# Patient Record
Sex: MALE | Race: WHITE | NOT HISPANIC OR LATINO | URBAN - METROPOLITAN AREA
[De-identification: names, ages, dates, MRNs, and addresses within clinical notes are randomized per-mention and may not be internally consistent; named-entity substitution may affect disease eponyms.]

---

## 2017-10-06 ENCOUNTER — EMERGENCY (EMERGENCY)
Facility: HOSPITAL | Age: 62
LOS: 0 days | Discharge: HOME | End: 2017-10-06

## 2017-10-06 DIAGNOSIS — Z91.038 OTHER INSECT ALLERGY STATUS: ICD-10-CM

## 2017-10-06 DIAGNOSIS — T63.441A TOXIC EFFECT OF VENOM OF BEES, ACCIDENTAL (UNINTENTIONAL), INITIAL ENCOUNTER: ICD-10-CM

## 2017-10-06 DIAGNOSIS — L03.119 CELLULITIS OF UNSPECIFIED PART OF LIMB: ICD-10-CM

## 2019-07-08 PROBLEM — Z00.00 ENCOUNTER FOR PREVENTIVE HEALTH EXAMINATION: Status: ACTIVE | Noted: 2019-07-08

## 2019-07-09 ENCOUNTER — APPOINTMENT (OUTPATIENT)
Dept: VASCULAR SURGERY | Facility: CLINIC | Age: 64
End: 2019-07-09
Payer: MEDICARE

## 2019-07-09 VITALS
DIASTOLIC BLOOD PRESSURE: 90 MMHG | BODY MASS INDEX: 31.83 KG/M2 | HEIGHT: 68 IN | SYSTOLIC BLOOD PRESSURE: 130 MMHG | WEIGHT: 210 LBS

## 2019-07-09 DIAGNOSIS — Z87.891 PERSONAL HISTORY OF NICOTINE DEPENDENCE: ICD-10-CM

## 2019-07-09 DIAGNOSIS — Z85.46 PERSONAL HISTORY OF MALIGNANT NEOPLASM OF PROSTATE: ICD-10-CM

## 2019-07-09 DIAGNOSIS — M79.89 OTHER SPECIFIED SOFT TISSUE DISORDERS: ICD-10-CM

## 2019-07-09 PROCEDURE — 93971 EXTREMITY STUDY: CPT

## 2019-07-09 PROCEDURE — 99203 OFFICE O/P NEW LOW 30 MIN: CPT

## 2019-07-09 NOTE — ASSESSMENT
[FreeTextEntry1] : 62 y/o gentleman who underwent left ankle replacement in November 2018 for flat foot, now with left leg swelling since the surgery, denies any h/o DVT, has leg heaviness due to swelling.\par I performed a venous duplex which was medically necessary to evaluate for DVT. It showed no evidence of DVT in the left lower extremity.\par I have informed him of the test results and advised use of compression stockings to improve the edema. I have explained to him that he may have chronic leg swelling due to the past surgical intervention.

## 2019-07-09 NOTE — CONSULT LETTER
[Consult Letter:] : I had the pleasure of evaluating your patient, [unfilled]. [Dear  ___] : Dear  [unfilled], [Please see my note below.] : Please see my note below.

## 2019-07-09 NOTE — HISTORY OF PRESENT ILLNESS
[FreeTextEntry1] : 62 y/o gentleman who underwent left ankle replacement in November 2018 for flat foot, now with left leg swelling since the surgery, denies any h/o DVT, has leg heaviness due to swelling.

## 2019-07-09 NOTE — DATA REVIEWED
[FreeTextEntry1] : I performed a venous duplex which was medically necessary to evaluate for DVT. It showed no evidence of DVT in the left lower extremity.\par

## 2021-07-22 ENCOUNTER — INPATIENT (INPATIENT)
Facility: HOSPITAL | Age: 66
LOS: 1 days | Discharge: HOME | End: 2021-07-24
Payer: MEDICARE

## 2021-07-22 VITALS
WEIGHT: 188.05 LBS | TEMPERATURE: 97 F | HEIGHT: 68 IN | DIASTOLIC BLOOD PRESSURE: 46 MMHG | SYSTOLIC BLOOD PRESSURE: 96 MMHG | RESPIRATION RATE: 18 BRPM | OXYGEN SATURATION: 96 % | HEART RATE: 53 BPM

## 2021-07-22 DIAGNOSIS — Z98.890 OTHER SPECIFIED POSTPROCEDURAL STATES: Chronic | ICD-10-CM

## 2021-07-22 LAB
ALBUMIN SERPL ELPH-MCNC: 3.8 G/DL — SIGNIFICANT CHANGE UP (ref 3.5–5.2)
ALP SERPL-CCNC: 60 U/L — SIGNIFICANT CHANGE UP (ref 30–115)
ALT FLD-CCNC: 9 U/L — SIGNIFICANT CHANGE UP (ref 0–41)
ANION GAP SERPL CALC-SCNC: 15 MMOL/L — HIGH (ref 7–14)
APPEARANCE UR: CLEAR — SIGNIFICANT CHANGE UP
APTT BLD: 25.2 SEC — LOW (ref 27–39.2)
AST SERPL-CCNC: 9 U/L — SIGNIFICANT CHANGE UP (ref 0–41)
BASOPHILS # BLD AUTO: 0.03 K/UL — SIGNIFICANT CHANGE UP (ref 0–0.2)
BASOPHILS NFR BLD AUTO: 0.4 % — SIGNIFICANT CHANGE UP (ref 0–1)
BILIRUB SERPL-MCNC: 0.3 MG/DL — SIGNIFICANT CHANGE UP (ref 0.2–1.2)
BILIRUB UR-MCNC: NEGATIVE — SIGNIFICANT CHANGE UP
BUN SERPL-MCNC: 78 MG/DL — CRITICAL HIGH (ref 10–20)
CALCIUM SERPL-MCNC: 8.8 MG/DL — SIGNIFICANT CHANGE UP (ref 8.5–10.1)
CHLORIDE SERPL-SCNC: 95 MMOL/L — LOW (ref 98–110)
CO2 SERPL-SCNC: 20 MMOL/L — SIGNIFICANT CHANGE UP (ref 17–32)
COLOR SPEC: YELLOW — SIGNIFICANT CHANGE UP
CREAT SERPL-MCNC: 3.8 MG/DL — HIGH (ref 0.7–1.5)
DIFF PNL FLD: NEGATIVE — SIGNIFICANT CHANGE UP
EOSINOPHIL # BLD AUTO: 0.05 K/UL — SIGNIFICANT CHANGE UP (ref 0–0.7)
EOSINOPHIL NFR BLD AUTO: 0.7 % — SIGNIFICANT CHANGE UP (ref 0–8)
GLUCOSE SERPL-MCNC: 96 MG/DL — SIGNIFICANT CHANGE UP (ref 70–99)
GLUCOSE UR QL: NEGATIVE MG/DL — SIGNIFICANT CHANGE UP
HCT VFR BLD CALC: 31.3 % — LOW (ref 42–52)
HGB BLD-MCNC: 10.6 G/DL — LOW (ref 14–18)
IMM GRANULOCYTES NFR BLD AUTO: 0.6 % — HIGH (ref 0.1–0.3)
INR BLD: 1.01 RATIO — SIGNIFICANT CHANGE UP (ref 0.65–1.3)
KETONES UR-MCNC: NEGATIVE — SIGNIFICANT CHANGE UP
LACTATE SERPL-SCNC: 1.4 MMOL/L — SIGNIFICANT CHANGE UP (ref 0.7–2)
LEUKOCYTE ESTERASE UR-ACNC: NEGATIVE — SIGNIFICANT CHANGE UP
LYMPHOCYTES # BLD AUTO: 0.67 K/UL — LOW (ref 1.2–3.4)
LYMPHOCYTES # BLD AUTO: 9.3 % — LOW (ref 20.5–51.1)
MCHC RBC-ENTMCNC: 31 PG — SIGNIFICANT CHANGE UP (ref 27–31)
MCHC RBC-ENTMCNC: 33.9 G/DL — SIGNIFICANT CHANGE UP (ref 32–37)
MCV RBC AUTO: 91.5 FL — SIGNIFICANT CHANGE UP (ref 80–94)
MONOCYTES # BLD AUTO: 0.53 K/UL — SIGNIFICANT CHANGE UP (ref 0.1–0.6)
MONOCYTES NFR BLD AUTO: 7.3 % — SIGNIFICANT CHANGE UP (ref 1.7–9.3)
NEUTROPHILS # BLD AUTO: 5.92 K/UL — SIGNIFICANT CHANGE UP (ref 1.4–6.5)
NEUTROPHILS NFR BLD AUTO: 81.7 % — HIGH (ref 42.2–75.2)
NITRITE UR-MCNC: NEGATIVE — SIGNIFICANT CHANGE UP
NRBC # BLD: 0 /100 WBCS — SIGNIFICANT CHANGE UP (ref 0–0)
PH UR: 5.5 — SIGNIFICANT CHANGE UP (ref 5–8)
PLATELET # BLD AUTO: 205 K/UL — SIGNIFICANT CHANGE UP (ref 130–400)
POTASSIUM SERPL-MCNC: 4.6 MMOL/L — SIGNIFICANT CHANGE UP (ref 3.5–5)
POTASSIUM SERPL-SCNC: 4.6 MMOL/L — SIGNIFICANT CHANGE UP (ref 3.5–5)
PROT SERPL-MCNC: 5.5 G/DL — LOW (ref 6–8)
PROT UR-MCNC: NEGATIVE MG/DL — SIGNIFICANT CHANGE UP
PROTHROM AB SERPL-ACNC: 11.6 SEC — SIGNIFICANT CHANGE UP (ref 9.95–12.87)
RBC # BLD: 3.42 M/UL — LOW (ref 4.7–6.1)
RBC # FLD: 13.2 % — SIGNIFICANT CHANGE UP (ref 11.5–14.5)
SARS-COV-2 RNA SPEC QL NAA+PROBE: SIGNIFICANT CHANGE UP
SODIUM SERPL-SCNC: 130 MMOL/L — LOW (ref 135–146)
SP GR SPEC: 1.01 — SIGNIFICANT CHANGE UP (ref 1.01–1.03)
TROPONIN T SERPL-MCNC: <0.01 NG/ML — SIGNIFICANT CHANGE UP
UROBILINOGEN FLD QL: 0.2 MG/DL — SIGNIFICANT CHANGE UP (ref 0.2–0.2)
WBC # BLD: 7.24 K/UL — SIGNIFICANT CHANGE UP (ref 4.8–10.8)
WBC # FLD AUTO: 7.24 K/UL — SIGNIFICANT CHANGE UP (ref 4.8–10.8)

## 2021-07-22 PROCEDURE — 93010 ELECTROCARDIOGRAM REPORT: CPT

## 2021-07-22 PROCEDURE — 70498 CT ANGIOGRAPHY NECK: CPT | Mod: 26,MA

## 2021-07-22 PROCEDURE — 70450 CT HEAD/BRAIN W/O DYE: CPT | Mod: 26,59

## 2021-07-22 PROCEDURE — 99291 CRITICAL CARE FIRST HOUR: CPT

## 2021-07-22 PROCEDURE — 99222 1ST HOSP IP/OBS MODERATE 55: CPT

## 2021-07-22 PROCEDURE — 0042T: CPT

## 2021-07-22 PROCEDURE — 70496 CT ANGIOGRAPHY HEAD: CPT | Mod: 26,MA

## 2021-07-22 PROCEDURE — 76775 US EXAM ABDO BACK WALL LIM: CPT | Mod: 26

## 2021-07-22 RX ORDER — HEPARIN SODIUM 5000 [USP'U]/ML
5000 INJECTION INTRAVENOUS; SUBCUTANEOUS EVERY 8 HOURS
Refills: 0 | Status: DISCONTINUED | OUTPATIENT
Start: 2021-07-22 | End: 2021-07-24

## 2021-07-22 RX ORDER — CHLORHEXIDINE GLUCONATE 213 G/1000ML
1 SOLUTION TOPICAL
Refills: 0 | Status: DISCONTINUED | OUTPATIENT
Start: 2021-07-22 | End: 2021-07-24

## 2021-07-22 RX ORDER — SODIUM CHLORIDE 9 MG/ML
1000 INJECTION, SOLUTION INTRAVENOUS
Refills: 0 | Status: DISCONTINUED | OUTPATIENT
Start: 2021-07-22 | End: 2021-07-24

## 2021-07-22 RX ORDER — ASPIRIN/CALCIUM CARB/MAGNESIUM 324 MG
81 TABLET ORAL DAILY
Refills: 0 | Status: DISCONTINUED | OUTPATIENT
Start: 2021-07-22 | End: 2021-07-22

## 2021-07-22 RX ORDER — SODIUM CHLORIDE 9 MG/ML
1000 INJECTION, SOLUTION INTRAVENOUS ONCE
Refills: 0 | Status: COMPLETED | OUTPATIENT
Start: 2021-07-22 | End: 2021-07-22

## 2021-07-22 RX ORDER — ATORVASTATIN CALCIUM 80 MG/1
80 TABLET, FILM COATED ORAL AT BEDTIME
Refills: 0 | Status: DISCONTINUED | OUTPATIENT
Start: 2021-07-22 | End: 2021-07-23

## 2021-07-22 RX ORDER — FUROSEMIDE 40 MG
20 TABLET ORAL DAILY
Refills: 0 | Status: DISCONTINUED | OUTPATIENT
Start: 2021-07-22 | End: 2021-07-23

## 2021-07-22 RX ADMIN — SODIUM CHLORIDE 1000 MILLILITER(S): 9 INJECTION, SOLUTION INTRAVENOUS at 16:38

## 2021-07-22 RX ADMIN — SODIUM CHLORIDE 100 MILLILITER(S): 9 INJECTION, SOLUTION INTRAVENOUS at 23:04

## 2021-07-22 NOTE — ED PROVIDER NOTE - OBJECTIVE STATEMENT
66 yo M pmhx HTN BIBEMS for evaluation of sudden visual changes, dizziness, diaphoresis, and seizure like episode at 3pm today. Pt reports he was playing in his yard with his grandson, sudden onset of bilateral blurry vision, felt dizzy and became diaphoretic, lasted 8 minutes, came inside to his wife who gave him water and had him sit down. Wife reports tonic clonic like shaking for 1 minute. Wife states pt had slurred speech for a few minutes. Since that time pt symptoms have completely resolved and have not returned. Denies fever, chills, nausea, vomiting, chest pain, sob, weakness, headache, numbness/tingling, facial droop.

## 2021-07-22 NOTE — H&P ADULT - HISTORY OF PRESENT ILLNESS
Patient is a 65y old  Male with PMhx of HTN, had blurry vision x 1 day and possible seizure witness by wife. Pt has no urinary or bowel incontinence No fever chills.

## 2021-07-22 NOTE — ED PROVIDER NOTE - CARE PLAN
Principal Discharge DX:	Meningioma  Secondary Diagnosis:	LOIS (acute kidney injury)  Secondary Diagnosis:	Visual changes   Principal Discharge DX:	Seizure-like activity  Secondary Diagnosis:	LOIS (acute kidney injury)  Secondary Diagnosis:	Visual changes  Secondary Diagnosis:	Transient ischemic attack (TIA)

## 2021-07-22 NOTE — ED PROVIDER NOTE - ATTENDING CONTRIBUTION TO CARE
* I agree with PA documentation*  I personally evaluated patient. I agree with the findings and plan with all documentation on chart except as documented  in my note.    Patient called a Stroke Code upon arrival for vision changes and dizziness. History taken with GRANT Brandt.    64 yo M PMHx HTN BIBEMS for evaluation of sudden visual changes, dizziness, diaphoresis, and seizure like episode at 3pm today. Pt reports he was playing in his yard with his grandson, sudden onset of bilateral blurry vision, felt dizzy and became diaphoretic, lasted 8 minutes, came inside to his wife who gave him water and had him sit down. Wife reports tonic clonic like shaking for 1 minute. Wife states pt had slurred speech for a few minutes. Since that time pt symptoms have completely resolved and have not returned. Denies fever, chills, nausea, vomiting, chest pain, sob, weakness, headache, numbness/tingling, facial droop.    Patient reports losing about 20-25 pounds recently and made significant changes to his diet to become healthier. PMD is Dr. Heath. Patient went for emergent CT scan and CTA of head and neck/CTP. Initial NIH score is 0 and patient is not a t-PA candidate. Repeat Neuro exam unchanged and patient remains feeling well. Patient has no LVO and is not an interventional candidate. CT head shows a small meningioma, which is unlikely causing any of his symptoms today. Labs are concerning for LOIS. Patient had elevated BUN with Creatinine of 3.8. K= and fluid status are normal and patient is urinating normally. UA sent, renal US ordered. Case discussed with Neuro Dr. Naun Weems and will admit to CCU for Neuro checks and further work up, possible EEG testing. Dr. Heath aware of admission and requests Dr. Escobar for Renal. Consult called. Dr. Churchill called and accepts CCU admission. Will not start and anti-epileptic medication at this time. Will admit for LOIS with possible TIA vs new-onset seizure.    Patient and family spoken to in detail about results and plan of care.  All questions addressed.  Results of ED work up discussed and patient/family given a copy of the results. They are aware patient requires admission for further treatment and work up.

## 2021-07-22 NOTE — ED PROVIDER NOTE - PROGRESS NOTE DETAILS
NC: Spoke to Neurology, admit pt to CCU, q4 hour neuro checks, EEG, TIA workup NC: Spoke to Dr. Escobar recommend hydration.

## 2021-07-22 NOTE — H&P ADULT - ASSESSMENT
A/P:     1. Seizure / r/o CVA   - neuro checks q 4 hrs   - 2 d echo   - carotid dopplers   - IV fluids   - replete electrolytes   - DVT ppx  A/P:     1. Seizure / r/o CVA   - neuro checks q 4 hrs   - 2 d echo   - consider eeg   - IV fluids   - replete electrolytes   - CT scan reviwed + meningioma   - neuro fu   - DVT ppx

## 2021-07-22 NOTE — H&P ADULT - NSHPPHYSICALEXAM_GEN_ALL_CORE
PHYSICAL EXAM:  GENERAL: NAD, well-groomed, well-developed  HEAD:  Atraumatic, Normocephalic  EYES: EOMI, PERRLA, conjunctiva and sclera clear  ENMT: no thrush   NECK: Supple, No JVD, Normal thyroid  NERVOUS SYSTEM:  Alert & Oriented X3,    CHEST/LUNG: Clear to percussion bilaterally; No rales, rhonchi, wheezing, or rubs  HEART: Regular rate and rhythm; No murmurs, rubs, or gallops  ABDOMEN: Soft, Nontender, Nondistended; Bowel sounds present  EXTREMITIES:  2+ Peripheral Pulses, No clubbing, cyanosis, or edema  LYMPH: No lymphadenopathy noted  SKIN: No rashes or lesions

## 2021-07-22 NOTE — ED PROVIDER NOTE - PHYSICAL EXAMINATION
GENERAL: Well-nourished, Well-developed. NAD.  HEAD: No visible or palpable bumps or hematomas. No ecchymosis behind ears B/L.  Eyes: PERRLA, EOMI. No asymmetry. No nystagmus. No conjunctival injection. Non-icteric sclera.  ENMT: MMM.   Neck: Supple. FROM  CVS: RRR. Normal S1,S2. No murmurs appreciated on auscultation   RESP: No use of accessory muscles. Chest rise symmetrical with good expansion. Lungs clear to auscultation B/L. No wheezing, rales, or rhonchi auscultated.  GI: Normal auscultation of bowel sounds in all 4 quadrants. Soft, Nontender, Nondistended. No guarding or rebound tenderness. No CVAT B/L.  Skin: Warm, Dry. No rashes or lesions. Good cap refill < 2 sec B/L.  EXT: Radial and pedal pulses present B/L. No calf tenderness or swelling B/L. No palpable cords. No pedal edema B/L.  Neuro: AA&O x 3. CNs II-XII grossly intact. Speaking in full sentences. No slurring of speech. No facial droop. No tremors. Sensation grossly intact. Strength 5/5 B/L. Gait within normal limits. Negative Romberg and Pronator Drift. Normal Finger to nose exam. Visual fields intact.

## 2021-07-22 NOTE — ED PROVIDER NOTE - NS ED ROS FT
Constitutional: (-) fever (-) malaise (-) diaphoresis (-) chills   Eyes: (+) visual changes (-) eye pain (-) eye discharge (-) photophobia (-) FB sensation  ENMT: (-) nasal or chest congestion (-) runny nose (-) sore throat (-) hoarseness  (-) hearing changes (-) ear pain (-) ear discharge or infections (-) neck pain (-) neck stiffness  Cardiac: (-) chest pain  (-) palpitations (-) syncope (-) edema  Respiratory: (-) cough (-) SOB (-) CONTRERAS  GI: (-) nausea (-) vomiting (-) diarrhea (-) abdominal pain  : (-) dysuria (-) increased frequency  (-) hematuria (-) incontinence  MS: (-) back pain (-) myalgia (-) muscle weakness (-)  joint pain  Neuro: (-) headache (+) dizziness (-) numbness/tingling to extremities B/L (-) weakness (-) LOC (-) head injury (-) AMS (-) saddle anesthesia (-) tremors  Skin: (-) rash (-) laceration    Except as documented in the HPI, all other systems are negative.

## 2021-07-22 NOTE — PATIENT PROFILE ADULT - FALL HARM RISK CONCLUSION
See 10/22/19 refill encounter note:    October 25, 2019   Veronica Saeed, RN          11:02 AM   Note      I spoke to patient. She states that she has moved and that she has seen a provider in [place] but that the pharmacy and doctor haven't quite got all of her prescriptions right. She asked for one refill of her insulin needles because she only has 2 left. Will send in one refill. Messaged pharmacy to contact her current provider for any further refills.  Veronica Saeed, RN            I called pharmacy, they also say the patient has gotten  and has a different last name but they found her and will re-route to new PCP.    Megan Hendrickson, LATHA  [facility]       Fall Risk

## 2021-07-22 NOTE — ED PROVIDER NOTE - CLINICAL SUMMARY MEDICAL DECISION MAKING FREE TEXT BOX
64 yo M PMHx HTN BIBEMS for evaluation of sudden visual changes, dizziness, diaphoresis, and seizure like episode at 3pm today. Pt reports he was playing in his yard with his grandson, sudden onset of bilateral blurry vision, felt dizzy and became diaphoretic, lasted 8 minutes, came inside to his wife who gave him water and had him sit down. Wife reports tonic clonic like shaking for 1 minute. Wife states pt had slurred speech for a few minutes. Since that time pt symptoms have completely resolved and have not returned. Denies fever, chills, nausea, vomiting, chest pain, sob, weakness, headache, numbness/tingling, facial droop.    Patient reports losing about 20-25 pounds recently and made significant changes to his diet to become healthier. PMD is Dr. Heath. Patient went for emergent CT scan and CTA of head and neck/CTP. Initial NIH score is 0 and patient is not a t-PA candidate. Repeat Neuro exam unchanged and patient remains feeling well. Patient has no LVO and is not an interventional candidate. CT head shows a small meningioma, which is unlikely causing any of his symptoms today. Labs are concerning for LOIS. Patient had elevated BUN with Creatinine of 3.8. K= and fluid status are normal and patient is urinating normally. UA sent, renal US ordered. Case discussed with Neuro Dr. Naun Weems and will admit to CCU for Neuro checks and further work up, possible EEG testing. Dr. Heath aware of admission and requests Dr. Escobar for Renal. Consult called. Dr. Chucrhill called and accepts CCU admission. Will not start and anti-epileptic medication at this time. Will admit for LOIS with possible TIA vs new-onset seizure.

## 2021-07-22 NOTE — H&P ADULT - NSHPREVIEWOFSYSTEMS_GEN_ALL_CORE
REVIEW OF SYSTEMS  General:	no dizziness  Skin/Breast: no rashes   Ophthalmologic:	had blurry vision   ENMT:	no thrush   Respiratory and Thorax: 	no sob   Cardiovascular:	no chest pain   Gastrointestinal:	no diarrhea   Genitourinary:	no dysuria  Musculoskeletal:	no wekaness  Neurological:	no wekaness   Psychiatric:	no hallucinations

## 2021-07-22 NOTE — H&P ADULT - NSHPLABSRESULTS_GEN_ALL_CORE
labs  07-22    130<L>  |  95<L>  |  78<HH>  ----------------------------<  96  4.6   |  20  |  3.8<H>    Ca    8.8      22 Jul 2021 16:45    TPro  5.5<L>  /  Alb  3.8  /  TBili  0.3  /  DBili  x   /  AST  9   /  ALT  9   /  AlkPhos  60  07-22                          10.6   7.24  )-----------( 205      ( 22 Jul 2021 16:45 )             31.3         PT/INR - ( 22 Jul 2021 16:45 )   PT: 11.60 sec;   INR: 1.01 ratio         PTT - ( 22 Jul 2021 16:45 )  PTT:25.2 sec

## 2021-07-22 NOTE — ED ADULT NURSE NOTE - NSIMPLEMENTINTERV_GEN_ALL_ED
Implemented All Universal Safety Interventions:  [place] to call system. Call bell, personal items and telephone within reach. Instruct patient to call for assistance. Room bathroom lighting operational. Non-slip footwear when patient is off stretcher. Physically safe environment: no spills, clutter or unnecessary equipment. Stretcher in lowest position, wheels locked, appropriate side rails in place.

## 2021-07-23 ENCOUNTER — TRANSCRIPTION ENCOUNTER (OUTPATIENT)
Age: 66
End: 2021-07-23

## 2021-07-23 LAB
A1C WITH ESTIMATED AVERAGE GLUCOSE RESULT: 5.1 % — SIGNIFICANT CHANGE UP (ref 4–5.6)
ANION GAP SERPL CALC-SCNC: 9 MMOL/L — SIGNIFICANT CHANGE UP (ref 7–14)
BUN SERPL-MCNC: 57 MG/DL — HIGH (ref 10–20)
CALCIUM SERPL-MCNC: 8.8 MG/DL — SIGNIFICANT CHANGE UP (ref 8.5–10.1)
CHLORIDE SERPL-SCNC: 103 MMOL/L — SIGNIFICANT CHANGE UP (ref 98–110)
CHOLEST SERPL-MCNC: 143 MG/DL — SIGNIFICANT CHANGE UP
CO2 SERPL-SCNC: 22 MMOL/L — SIGNIFICANT CHANGE UP (ref 17–32)
CREAT SERPL-MCNC: 1.8 MG/DL — HIGH (ref 0.7–1.5)
ESTIMATED AVERAGE GLUCOSE: 100 MG/DL — SIGNIFICANT CHANGE UP (ref 68–114)
GLUCOSE SERPL-MCNC: 136 MG/DL — HIGH (ref 70–99)
HCT VFR BLD CALC: 30.3 % — LOW (ref 42–52)
HCV AB S/CO SERPL IA: 0.03 COI — SIGNIFICANT CHANGE UP
HCV AB SERPL-IMP: SIGNIFICANT CHANGE UP
HDLC SERPL-MCNC: 46 MG/DL — SIGNIFICANT CHANGE UP
HGB BLD-MCNC: 10.2 G/DL — LOW (ref 14–18)
LIPID PNL WITH DIRECT LDL SERPL: 73 MG/DL — SIGNIFICANT CHANGE UP
MAGNESIUM SERPL-MCNC: 2.4 MG/DL — SIGNIFICANT CHANGE UP (ref 1.8–2.4)
MCHC RBC-ENTMCNC: 31.2 PG — HIGH (ref 27–31)
MCHC RBC-ENTMCNC: 33.7 G/DL — SIGNIFICANT CHANGE UP (ref 32–37)
MCV RBC AUTO: 92.7 FL — SIGNIFICANT CHANGE UP (ref 80–94)
NON HDL CHOLESTEROL: 97 MG/DL — SIGNIFICANT CHANGE UP
NRBC # BLD: 0 /100 WBCS — SIGNIFICANT CHANGE UP (ref 0–0)
PLATELET # BLD AUTO: 180 K/UL — SIGNIFICANT CHANGE UP (ref 130–400)
POTASSIUM SERPL-MCNC: 4.7 MMOL/L — SIGNIFICANT CHANGE UP (ref 3.5–5)
POTASSIUM SERPL-SCNC: 4.7 MMOL/L — SIGNIFICANT CHANGE UP (ref 3.5–5)
RBC # BLD: 3.27 M/UL — LOW (ref 4.7–6.1)
RBC # FLD: 13.3 % — SIGNIFICANT CHANGE UP (ref 11.5–14.5)
SODIUM SERPL-SCNC: 134 MMOL/L — LOW (ref 135–146)
TRIGL SERPL-MCNC: 124 MG/DL — SIGNIFICANT CHANGE UP
TSH SERPL-MCNC: 1.33 UIU/ML — SIGNIFICANT CHANGE UP (ref 0.27–4.2)
WBC # BLD: 4.77 K/UL — LOW (ref 4.8–10.8)
WBC # FLD AUTO: 4.77 K/UL — LOW (ref 4.8–10.8)

## 2021-07-23 PROCEDURE — 99232 SBSQ HOSP IP/OBS MODERATE 35: CPT

## 2021-07-23 PROCEDURE — 93306 TTE W/DOPPLER COMPLETE: CPT | Mod: 26

## 2021-07-23 PROCEDURE — 99222 1ST HOSP IP/OBS MODERATE 55: CPT

## 2021-07-23 RX ORDER — SODIUM CHLORIDE 9 MG/ML
500 INJECTION, SOLUTION INTRAVENOUS ONCE
Refills: 0 | Status: COMPLETED | OUTPATIENT
Start: 2021-07-23 | End: 2021-07-23

## 2021-07-23 RX ADMIN — HEPARIN SODIUM 5000 UNIT(S): 5000 INJECTION INTRAVENOUS; SUBCUTANEOUS at 21:59

## 2021-07-23 RX ADMIN — SODIUM CHLORIDE 100 MILLILITER(S): 9 INJECTION, SOLUTION INTRAVENOUS at 06:11

## 2021-07-23 RX ADMIN — HEPARIN SODIUM 5000 UNIT(S): 5000 INJECTION INTRAVENOUS; SUBCUTANEOUS at 14:06

## 2021-07-23 RX ADMIN — CHLORHEXIDINE GLUCONATE 1 APPLICATION(S): 213 SOLUTION TOPICAL at 06:31

## 2021-07-23 RX ADMIN — SODIUM CHLORIDE 666.67 MILLILITER(S): 9 INJECTION, SOLUTION INTRAVENOUS at 00:00

## 2021-07-23 RX ADMIN — SODIUM CHLORIDE 1000 MILLILITER(S): 9 INJECTION, SOLUTION INTRAVENOUS at 02:00

## 2021-07-23 NOTE — CONSULT NOTE ADULT - ASSESSMENT
LOIS   - Baseline Cr unavailable   - rapidly improving already   - due to hypotension / volume depletion, lasix and ACE-I   - UA bland, renal sono nl   - s/p iv dye in ED  resolved hypotension  syncope / ?seizure  meningioma    plan:    cont IVF  keep off lasix and lisinopril  monitor BP's   encourage po hydration  no NSAIDs  obtain recent Cr  f/u neuro  outpt renal f/u with BMP check

## 2021-07-23 NOTE — DISCHARGE NOTE PROVIDER - HOSPITAL COURSE
65y old  Male with PMhx of HTN, had blurry vision x 1 day and possible seizure witness by wife. Pt has no urinary or bowel incontinence No fever chills.  likely patient had Orthostatic hypotension- improved/ s/p IVF, less likely stroke, sunitalup august, also found to have LOIS likely pre-renal improved s/p IVF    65y old  Male with PMhx of HTN, had blurry vision x 1 day and possible seizure witness by wife. Pt has no urinary or bowel incontinence No fever chills.  likely patient had Orthostatic hypotension- improved/ s/p IVF, less likely stroke, workup negative, also found to have LOIS likely pre-renal improved s/p IVF

## 2021-07-23 NOTE — DISCHARGE NOTE PROVIDER - CARE PROVIDER_API CALL
David Heath [facility]  [address] [place] [address]  Phone: [phone]  Fax: [phone]  Follow Up Time: 2 weeks

## 2021-07-23 NOTE — DISCHARGE NOTE PROVIDER - NSDCMRMEDTOKEN_GEN_ALL_CORE_FT
furosemide 20 mg oral tablet: 1 tab(s) orally once a day  lisinopril 10 mg oral tablet: 1 tab(s) orally once a day  tadalafil 5 mg oral tablet: 1 tab(s) orally once a day

## 2021-07-23 NOTE — DISCHARGE NOTE PROVIDER - INSTRUCTIONS
Eat more vegetables and fruits.  Swap refined grains for whole grains.  Choose fat-free or low-fat dairy products.  Choose lean protein sources like fish, poultry and beans.  Cook with vegetable oils.  Limit your intake of foods high in added sugars, like soda and candy.

## 2021-07-23 NOTE — PROGRESS NOTE ADULT - ASSESSMENT
65y old  Male with PMhx of HTN, had blurry vision x 1 day and possible seizure witness by wife. Pt has no urinary or bowel incontinence No fever chills.       #Orthostatic hypotension- improved/ less likely seizure  - neuro checks q 4 hrs   - 2 d echo  normal   - S/p IVF, c/w for now, tolerating PO diet  - CTH / CTA H/N negative  - F/u A1c and lipid profile   - Neurology follow up    #HTN   - resume BP medicine if hypertensive    #LOIS likely pre-renal - imorived s/p IVF  - nephrology consult         Diet regular   DVT PPX lovenox   GI ppx not indicated   Dispo from home   Code full

## 2021-07-23 NOTE — CONSULT NOTE ADULT - ATTENDING COMMENTS
Patient seen and examined and agree with above except as noted.  Patients history, notes, labs, imaging, vitals and meds reviewed personally.  History reviewed with patient and he states he was aggressively working out and not drinking much and lost 25 pounds in 6-7 weeks.  His presentation was discussed with me yesterday and history suggested convulsive syncope episodes and dehydration due too the hypotension.  Labs supported dehydration  NIHSS 0  mrankin 0    Plan as above
Attending Statement: I have personally performed a face to face diagnostic evaluation on this patient. The patient is suffering from dizziness/ hypotension/ acute renal failure and dehydration.  I have reviewed the above note and agree with the history, exam and suggestions for care, except as I have noted in the text. I have amended the treatment plans as necessary.

## 2021-07-23 NOTE — CONSULT NOTE ADULT - SUBJECTIVE AND OBJECTIVE BOX
NEPHROLOGY CONSULTATION NOTE    64 yo male with PMH of HTN on lasix and lisinopril p/w dizziness, diaphoresis and possible seizure activity.  BP's on admission low.  Found to have LOIS.  Renal consultted.  Baseline Cr not available.  Pt states he has recently lost weight, dieted and increased exercise.  Given IVF in ED, now in ICU with improvement in renal function and BP's.  No significant voiding complaints.    PAST MEDICAL & SURGICAL HISTORY:  HTN (hypertension)    Prostate cancer    History of prostate surgery      Allergies:  No Known Allergies    Home Medications Reviewed    SOCIAL HISTORY:  Denies ETOH,Smoking,   FAMILY HISTORY:  No pertinent family history in first degree relatives          REVIEW OF SYSTEMS:  CONSTITUTIONAL: + weakness, no fevers or chills  EYES/ENT: No visual changes;  No vertigo or throat pain + dizziness yesterday  NECK: No pain or stiffness  RESPIRATORY: No cough, wheezing, hemoptysis; No shortness of breath  CARDIOVASCULAR: No chest pain or palpitations.  GASTROINTESTINAL: No abdominal or epigastric pain. No nausea, vomiting, or hematemesis; No diarrhea or constipation. No melena or hematochezia.  GENITOURINARY: No dysuria, frequency, foamy urine, urinary urgency, incontinence or hematuria  NEUROLOGICAL: No numbness or weakness  SKIN: No itching, burning, rashes, or lesions   VASCULAR: No bilateral lower extremity edema.   All other review of systems is negative unless indicated above.    PHYSICAL EXAM:  Constitutional: NAD  HEENT: anicteric sclera, oropharynx clear, MMM  Neck: No JVD  Respiratory: CTAB, no wheezes, rales or rhonchi  Cardiovascular: S1, S2, RRR  Gastrointestinal: BS+, soft, NT/ND  Extremities: No cyanosis or clubbing. No peripheral edema  Neurological: A/O x 3, no focal deficits  Psychiatric: Normal mood, normal affect  : No CVA tenderness. No rdz.   Skin: No rashes    Hospital Medications:   MEDICATIONS  (STANDING):  atorvastatin 80 milliGRAM(s) Oral at bedtime  chlorhexidine 4% Liquid 1 Application(s) Topical <User Schedule>  heparin   Injectable 5000 Unit(s) SubCutaneous every 8 hours  lactated ringers. 1000 milliLiter(s) (100 mL/Hr) IV Continuous <Continuous>        VITALS:  T(F): 96.5 (21 @ 07:10), Max: 97 (07-22-21 @ 15:37)  HR: 72 (21 @ 08:00)  BP: 145/81 (21 @ 08:00)  RR: 20 (21 @ 08:00)  SpO2: 100% (21 @ 08:00)  Wt(kg): --     @ 07:01  -   @ 07:00  --------------------------------------------------------  IN: 2100 mL / OUT: 1625 mL / NET: 475 mL     @ 07:01  -   @ 09:20  --------------------------------------------------------  IN: 100 mL / OUT: 0 mL / NET: 100 mL      Height (cm): 172.7 ( 20:29)  Weight (kg): 75.3 ( 20:29)  BMI (kg/m2): 25.2 ( 20:29)  BSA (m2): 1.89 ( 20:29)    LABS:      134<L>  |  103  |  57<H>  ----------------------------<  136<H>  4.7   |  22  |  1.8<H>    Ca    8.8      2021 05:41  Mg     2.4         TPro  5.5<L>  /  Alb  3.8  /  TBili  0.3  /  DBili      /  AST  9   /  ALT  9   /  AlkPhos  60                            10.2   4.77  )-----------( 180      ( 2021 05:41 )             30.3       Urine Studies:  Urinalysis Basic - ( 2021 18:00 )    Color: Yellow / Appearance: Clear / S.015 / pH:   Gluc:  / Ketone: Negative  / Bili: Negative / Urobili: 0.2 mg/dL   Blood:  / Protein: Negative mg/dL / Nitrite: Negative   Leuk Esterase: Negative / RBC:  / WBC    Sq Epi:  / Non Sq Epi:  / Bacteria:           RADIOLOGY & ADDITIONAL STUDIES:  
Neurology Consult  [facility] [id number]    Patient is a 65y old  Male who presents with a chief complaint of possible seizure / CVA (2021 13:54)      HPI:  Patient is a 65y old  Male with PMhx of HTN, had blurry vision x 1 day and possible seizure witness by wife. Pt has no urinary or bowel incontinence No fever chills.        (2021 20:40)      Interim HPI -  Pt seen at bedside with Dr Yeh, patient is awake and oriented with no complaints. Pt denies any h/a, dizziness, or visual disturbances. Pt states he has no other complaints.  He admits to above history and attributes to a one month hx of excessive physical exercise, associated with 25lb weight loss, decreased oral intake in hopes of dieting, and lack of oral hydration.  Pt is aware of dangers caused to kidneys and overall health - pt has no prior neurological issues.      PAST MEDICAL & SURGICAL HISTORY:  HTN (hypertension)    Prostate cancer    History of prostate surgery        FAMILY HISTORY:  No pertinent family history in first degree relatives        Allergies    No Known Allergies    Intolerances        MEDICATIONS  (STANDING):  chlorhexidine 4% Liquid 1 Application(s) Topical <User Schedule>  heparin   Injectable 5000 Unit(s) SubCutaneous every 8 hours  lactated ringers. 1000 milliLiter(s) (100 mL/Hr) IV Continuous <Continuous>    MEDICATIONS  (PRN):      Review of systems:    Constitutional: No fever, weight loss or fatigue    Eyes: No eye pain or discharge  ENMT:  No difficulty hearing; No sinus or throat pain  Neck: No pain or stiffness  Respiratory: No cough, wheezing, chills or hemoptysis  Cardiovascular: No chest pain, palpitations, shortness of breath, dyspnea on exertion  Gastrointestinal: No abdominal pain, nausea, vomiting or hematemesis; No diarrhea or constipation.   Genitourinary: No dysuria, frequency, hematuria or incontinence  Neurological: As per HPI  Skin: No rashes or lesions   Endocrine: No heat or cold intolerance; No hair loss  Musculoskeletal: No joint pain or swelling  Psychiatric: No depression, anxiety, mood swings  Heme/Lymph: No easy bruising or bleeding gums    Vital Signs Last 24 Hrs  T(C): 35.8 (2021 07:10), Max: 36.1 (2021 15:37)  T(F): 96.5 (2021 07:10), Max: 97 (2021 15:37)  HR: 59 (2021 11:30) (46 - 72)  BP: 106/49 (2021 11:30) (69/38 - 145/81)  BP(mean): 71 (2021 11:30) (49 - 103)  RR: 18 (2021 11:30) (14 - 21)  SpO2: 99% (2021 11:30) (96% - 100%)    Neurologic Examination:  General:  Appearance is consistent with chronologic age.  No abnormal facies.   General: The patient is oriented to person, place, time and date.  Recent and remote memory intact.  Fund of knowledge is intact and normal.  Language with normal repetition, comprehension and naming.  Nondysarthric.    Cranial nerves: intact VA, VFF.  EOMI w/o nystagmus, skew or reported double vision.  PERRL.  No ptosis/weakness of eyelid closure.  Facial sensation is normal with normal bite.  No facial asymmetry.  Hearing grossly intact b/l.  Palate elevates midline.  Tongue midline.  Motor examination:   Normal tone, bulk and range of motion.  No tenderness, twitching, tremors or involuntary movements.  Formal Muscle Strength Testing: (MRC grade R/L) 5/5 UE; 5/5 LE.  No observable drift.  Reflexes:  , clonus absent.  Sensory examination:   Intact to light touch and  proprioception in all extremities.  Cerebellum:   FTN/HKS intact with normal MIKE in all limbs.      Labs:   CBC Full  -  ( 2021 05:41 )  WBC Count : 4.77 K/uL  RBC Count : 3.27 M/uL  Hemoglobin : 10.2 g/dL  Hematocrit : 30.3 %  Platelet Count - Automated : 180 K/uL  Mean Cell Volume : 92.7 fL  Mean Cell Hemoglobin : 31.2 pg  Mean Cell Hemoglobin Concentration : 33.7 g/dL  Auto Neutrophil # : x  Auto Lymphocyte # : x  Auto Monocyte # : x  Auto Eosinophil # : x  Auto Basophil # : x  Auto Neutrophil % : x  Auto Lymphocyte % : x  Auto Monocyte % : x  Auto Eosinophil % : x  Auto Basophil % : x    07-    134<L>  |  103  |  57<H>  ----------------------------<  136<H>  4.7   |  22  |  1.8<H>    Ca    8.8      2021 05:41  Mg     2.4         TPro  5.5<L>  /  Alb  3.8  /  TBili  0.3  /  DBili  x   /  AST  9   /  ALT  9   /  AlkPhos  60      LIVER FUNCTIONS - ( 2021 16:45 )  Alb: 3.8 g/dL / Pro: 5.5 g/dL / ALK PHOS: 60 U/L / ALT: 9 U/L / AST: 9 U/L / GGT: x           PT/INR - ( 2021 16:45 )   PT: 11.60 sec;   INR: 1.01 ratio         PTT - ( 2021 16:45 )  PTT:25.2 sec  Urinalysis Basic - ( 2021 18:00 )    Color: Yellow / Appearance: Clear / S.015 / pH: x  Gluc: x / Ketone: Negative  / Bili: Negative / Urobili: 0.2 mg/dL   Blood: x / Protein: Negative mg/dL / Nitrite: Negative   Leuk Esterase: Negative / RBC: x / WBC x   Sq Epi: x / Non Sq Epi: x / Bacteria: x          Neuroimaging:  < from: CT Brain Stroke Protocol (21 @ 16:00) >    IMPRESSION:    1.  No evidence of acute intracranial hemorrhage or large territory infarct.    2.  Left falcine calcified meningioma measuring 1.5 cm.    Spoke with CHRIS JOY MD on 2021 4:07 PM withreadback.    --- End of Report ---      CARY COHEN MD; Attending Radiologist  This document has been electronically signed. 2021  4:09PM    < end of copied text >    < from: CT Angio Head w/ IV Cont (21 @ 16:33) >    IMPRESSION:    1.  No evidence of major vascular stenosis or occlusion. Normal perfusion images.    2.  1.8 cm left medial frontal / falcine calcified meningioma.    --- End of Report ---              CARY COHEN MD; Attending Radiologist  This document has been electronically signed. 2021  4:45PM    < end of copied text >        Assessment:  This is a 65y Male with h/o HTN, Prostate CA, and Prostate Sx, presents to hospital with syncope and concern and shaking, admitted for LOIS, Dehydration, Hypotension - No concern for Stroke or Stroke workup , presentation likely Convulsive Syncope secondary to Dehydration.    Plan:   - No Repeat CTH no MRI  - No further Neurological work up at present  - Continue medical management of Dehydration and LOIS  - Contact Neurology team if any new concerns      21 @ 14:35      
Patient is a 65y old  Male who presents with a chief complaint of possible seizure / CVA (2021 20:40)      HPI:  Patient is a 65y old  Male with PMhx of HTN, had blurry vision x 1 day and possible seizure witness by wife. Pt has no urinary or bowel incontinence No fever chills.        (2021 20:40)      PAST MEDICAL & SURGICAL HISTORY:  HTN (hypertension)    Prostate cancer    History of prostate surgery        SOCIAL HX:   Smoking                         ETOH                            Other    FAMILY HISTORY:  No pertinent family history in first degree relatives    .  No cardiovascular or pulmonary family history     REVIEW OF SYSTEMS:    All ROS are negative exept per HPI       Allergies    No Known Allergies    Intolerances          PHYSICAL EXAM  Vital Signs Last 24 Hrs  T(C): 35.8 (2021 07:10), Max: 36.1 (2021 15:37)  T(F): 96.5 (2021 07:10), Max: 97 (2021 15:37)  HR: 72 (2021 08:00) (46 - 72)  BP: 145/81 (2021 08:00) (69/38 - 145/81)  BP(mean): 103 (2021 08:00) (49 - 103)  RR: 20 (2021 08:00) (14 - 21)  SpO2: 100% (2021 08:00) (96% - 100%)    CONSTITUTIONAL:  Well nourished.  NAD    ENT:   Airway patent,   No thrush    EYES:   Clear bilaterally,   pupils equal,   round and reactive to light.    CARDIAC:   Normal rate,   regular rhythm.    no edema      RESPIRATORY:   No wheezing   Normal chest expansion  Not tachypneic,  No use of accessory muscles    GASTROINTESTINAL:  Abdomen soft, non-tender,   No guarding,   Positive BS    MUSCULOSKELETAL:   Range of motion is not limited,  No clubbing, cyanosis    NEUROLOGICAL:   Alert and oriented   No motor deficits.    SKIN:   Skin normal color for race,   No evidence of rash.      HEME LYMPH:   No cervical  lymphadenopathy.  no inguinal lymphadenopathy          LABS:                          10.2   4.77  )-----------( 180      ( 2021 05:41 )             30.3                                               07-23    134<L>  |  103  |  57<H>  ----------------------------<  136<H>  4.7   |  22  |  1.8<H>    Ca    8.8      2021 05:41  Mg     2.4     07-    TPro  5.5<L>  /  Alb  3.8  /  TBili  0.3  /  DBili  x   /  AST  9   /  ALT  9   /  AlkPhos  60  07-      PT/INR - ( 2021 16:45 )   PT: 11.60 sec;   INR: 1.01 ratio         PTT - ( 2021 16:45 )  PTT:25.2 sec                                       Urinalysis Basic - ( 2021 18:00 )    Color: Yellow / Appearance: Clear / S.015 / pH: x  Gluc: x / Ketone: Negative  / Bili: Negative / Urobili: 0.2 mg/dL   Blood: x / Protein: Negative mg/dL / Nitrite: Negative   Leuk Esterase: Negative / RBC: x / WBC x   Sq Epi: x / Non Sq Epi: x / Bacteria: x        CARDIAC MARKERS ( 2021 16:45 )  x     / <0.01 ng/mL / x     / x     / x                                                LIVER FUNCTIONS - ( 2021 16:45 )  Alb: 3.8 g/dL / Pro: 5.5 g/dL / ALK PHOS: 60 U/L / ALT: 9 U/L / AST: 9 U/L / GGT: x                                                                                                MEDICATIONS  (STANDING):  atorvastatin 80 milliGRAM(s) Oral at bedtime  chlorhexidine 4% Liquid 1 Application(s) Topical <User Schedule>  heparin   Injectable 5000 Unit(s) SubCutaneous every 8 hours  lactated ringers. 1000 milliLiter(s) (100 mL/Hr) IV Continuous <Continuous>    MEDICATIONS  (PRN):      X-Rays reviewed:    CXR interpreted by me:

## 2021-07-23 NOTE — DISCHARGE NOTE PROVIDER - NSDCCPCAREPLAN_GEN_ALL_CORE_FT
PRINCIPAL DISCHARGE DIAGNOSIS  Diagnosis: Seizure-like activity  Assessment and Plan of Treatment: likely from orthostatic hypotension, please stay hydrated and drink lot of water      SECONDARY DISCHARGE DIAGNOSES  Diagnosis: LOIS (acute kidney injury)  Assessment and Plan of Treatment: likely from dehydration

## 2021-07-23 NOTE — CONSULT NOTE ADULT - ASSESSMENT
IMPRESSION:  dizziness/ hypotension/   HO HTN     PLAN:    CNS: Avoid sedatives, EEG, neuro eval    HEENT: Oral care    PULMONARY:  HOB @ 45 degrees.  Aspiration precautions     CARDIOVASCULAR: Goal MAP >65, ECHO. c/w IVF fluid    GI: GI prophylaxis.  Feeding.  Bowel regimen     RENAL:  Follow up lytes.  Correct as needed    INFECTIOUS DISEASE: Follow up cultures    HEMATOLOGICAL:  DVT prophylaxis.    ENDOCRINE:  Follow up FS.  Insulin protocol if needed.    MUSCULOSKELETAL: OOBTC/PT/OT    dispo: MICU         IMPRESSION:  dizziness/ hypotension/   acute renal failure  dehydration  HO HTN     PLAN:    CNS: Avoid sedatives, EEG, neuro eval    HEENT: Oral care    PULMONARY:  HOB @ 45 degrees.  Aspiration precautions     CARDIOVASCULAR: Goal MAP >65, ECHO. c/w IVF fluid    GI: GI prophylaxis.  Feeding.  Bowel regimen     RENAL:  Follow up lytes.  Correct as needed    INFECTIOUS DISEASE: Follow up cultures    HEMATOLOGICAL:  DVT prophylaxis.    ENDOCRINE:  Follow up FS.  Insulin protocol if needed.    MUSCULOSKELETAL: OOBTC/PT/OT    dispo: MICU

## 2021-07-23 NOTE — PROGRESS NOTE ADULT - ATTENDING COMMENTS
pt was seen and examined at approx 5:30am.  agree with above.  highly doubt  an epilepsy episode.  This is likely metabolic seizure and will treat as such.  correct electrolytes, prerenal azotemia.  no acute cardiac event here.  appreciate renal and pulm/CCM f/u.  Plan for d/c in am, I would not transfer to med floor but rather d/c home directly from the unit.

## 2021-07-24 ENCOUNTER — TRANSCRIPTION ENCOUNTER (OUTPATIENT)
Age: 66
End: 2021-07-24

## 2021-07-24 VITALS
HEART RATE: 59 BPM | RESPIRATION RATE: 20 BRPM | DIASTOLIC BLOOD PRESSURE: 53 MMHG | SYSTOLIC BLOOD PRESSURE: 112 MMHG | OXYGEN SATURATION: 100 %

## 2021-07-24 LAB
ALBUMIN SERPL ELPH-MCNC: 3.4 G/DL — LOW (ref 3.5–5.2)
ALP SERPL-CCNC: 55 U/L — SIGNIFICANT CHANGE UP (ref 30–115)
ALT FLD-CCNC: 8 U/L — SIGNIFICANT CHANGE UP (ref 0–41)
ANION GAP SERPL CALC-SCNC: 8 MMOL/L — SIGNIFICANT CHANGE UP (ref 7–14)
AST SERPL-CCNC: 8 U/L — SIGNIFICANT CHANGE UP (ref 0–41)
BASOPHILS # BLD AUTO: 0.03 K/UL — SIGNIFICANT CHANGE UP (ref 0–0.2)
BASOPHILS NFR BLD AUTO: 0.5 % — SIGNIFICANT CHANGE UP (ref 0–1)
BILIRUB SERPL-MCNC: 0.3 MG/DL — SIGNIFICANT CHANGE UP (ref 0.2–1.2)
BUN SERPL-MCNC: 24 MG/DL — HIGH (ref 10–20)
CALCIUM SERPL-MCNC: 8.9 MG/DL — SIGNIFICANT CHANGE UP (ref 8.5–10.1)
CHLORIDE SERPL-SCNC: 109 MMOL/L — SIGNIFICANT CHANGE UP (ref 98–110)
CO2 SERPL-SCNC: 25 MMOL/L — SIGNIFICANT CHANGE UP (ref 17–32)
COVID-19 SPIKE DOMAIN AB INTERP: POSITIVE
COVID-19 SPIKE DOMAIN ANTIBODY RESULT: >250 U/ML — HIGH
CREAT SERPL-MCNC: 1.1 MG/DL — SIGNIFICANT CHANGE UP (ref 0.7–1.5)
CULTURE RESULTS: NO GROWTH — SIGNIFICANT CHANGE UP
EOSINOPHIL # BLD AUTO: 0.11 K/UL — SIGNIFICANT CHANGE UP (ref 0–0.7)
EOSINOPHIL NFR BLD AUTO: 1.9 % — SIGNIFICANT CHANGE UP (ref 0–8)
GLUCOSE SERPL-MCNC: 96 MG/DL — SIGNIFICANT CHANGE UP (ref 70–99)
HCT VFR BLD CALC: 31 % — LOW (ref 42–52)
HGB BLD-MCNC: 10.5 G/DL — LOW (ref 14–18)
IMM GRANULOCYTES NFR BLD AUTO: 0.4 % — HIGH (ref 0.1–0.3)
LYMPHOCYTES # BLD AUTO: 1.02 K/UL — LOW (ref 1.2–3.4)
LYMPHOCYTES # BLD AUTO: 18 % — LOW (ref 20.5–51.1)
MAGNESIUM SERPL-MCNC: 1.9 MG/DL — SIGNIFICANT CHANGE UP (ref 1.8–2.4)
MCHC RBC-ENTMCNC: 32.3 PG — HIGH (ref 27–31)
MCHC RBC-ENTMCNC: 33.9 G/DL — SIGNIFICANT CHANGE UP (ref 32–37)
MCV RBC AUTO: 95.4 FL — HIGH (ref 80–94)
MONOCYTES # BLD AUTO: 0.53 K/UL — SIGNIFICANT CHANGE UP (ref 0.1–0.6)
MONOCYTES NFR BLD AUTO: 9.4 % — HIGH (ref 1.7–9.3)
NEUTROPHILS # BLD AUTO: 3.95 K/UL — SIGNIFICANT CHANGE UP (ref 1.4–6.5)
NEUTROPHILS NFR BLD AUTO: 69.8 % — SIGNIFICANT CHANGE UP (ref 42.2–75.2)
NRBC # BLD: 0 /100 WBCS — SIGNIFICANT CHANGE UP (ref 0–0)
PLATELET # BLD AUTO: 184 K/UL — SIGNIFICANT CHANGE UP (ref 130–400)
POTASSIUM SERPL-MCNC: 5.3 MMOL/L — HIGH (ref 3.5–5)
POTASSIUM SERPL-SCNC: 5.3 MMOL/L — HIGH (ref 3.5–5)
PROT SERPL-MCNC: 5.3 G/DL — LOW (ref 6–8)
RBC # BLD: 3.25 M/UL — LOW (ref 4.7–6.1)
RBC # FLD: 13.3 % — SIGNIFICANT CHANGE UP (ref 11.5–14.5)
SARS-COV-2 IGG+IGM SERPL QL IA: >250 U/ML — HIGH
SARS-COV-2 IGG+IGM SERPL QL IA: POSITIVE
SODIUM SERPL-SCNC: 142 MMOL/L — SIGNIFICANT CHANGE UP (ref 135–146)
SPECIMEN SOURCE: SIGNIFICANT CHANGE UP
WBC # BLD: 5.66 K/UL — SIGNIFICANT CHANGE UP (ref 4.8–10.8)
WBC # FLD AUTO: 5.66 K/UL — SIGNIFICANT CHANGE UP (ref 4.8–10.8)

## 2021-07-24 PROCEDURE — 99232 SBSQ HOSP IP/OBS MODERATE 35: CPT

## 2021-07-24 RX ORDER — LISINOPRIL 2.5 MG/1
1 TABLET ORAL
Qty: 0 | Refills: 0 | DISCHARGE

## 2021-07-24 RX ORDER — FUROSEMIDE 40 MG
1 TABLET ORAL
Qty: 0 | Refills: 0 | DISCHARGE

## 2021-07-24 RX ADMIN — CHLORHEXIDINE GLUCONATE 1 APPLICATION(S): 213 SOLUTION TOPICAL at 05:49

## 2021-07-24 RX ADMIN — SODIUM CHLORIDE 100 MILLILITER(S): 9 INJECTION, SOLUTION INTRAVENOUS at 03:35

## 2021-07-24 RX ADMIN — HEPARIN SODIUM 5000 UNIT(S): 5000 INJECTION INTRAVENOUS; SUBCUTANEOUS at 05:49

## 2021-07-24 NOTE — DISCHARGE NOTE NURSING/CASE MANAGEMENT/SOCIAL WORK - PATIENT PORTAL LINK FT
You can access the FollowMyHealth Patient Portal offered by [facility] by registering at the following website: [email or web address]. By joining [facility]’s FollowMyHealth portal, you will also be able to view your health information using other applications (apps) compatible with our system.

## 2021-07-24 NOTE — PROGRESS NOTE ADULT - SUBJECTIVE AND OBJECTIVE BOX
RENE WARD  65y  Male      Patient is a 65y old  Male who presents with a chief complaint of possible seizure / CVA (2021 09:17)      INTERVAL HPI/OVERNIGHT EVENTS:  no acute events overnight, comfortable in bed    Vital Signs Last 24 Hrs  T(C): 35.8 (2021 07:10), Max: 36.1 (2021 15:37)  T(F): 96.5 (2021 07:10), Max: 97 (2021 15:37)  HR: 72 (2021 08:00) (46 - 72)  BP: 145/81 (2021 08:00) (69/38 - 145/81)  BP(mean): 103 (2021 08:00) (49 - 103)  RR: 20 (2021 08:00) (14 - 21)  SpO2: 100% (2021 08:00) (96% - 100%)    PHYSICAL EXAM:  GENERAL: NAD, well-groomed, well-developed  HEAD:  Atraumatic, Normocephalic  EYES: EOMI, PERRLA, conjunctiva and sclera clear  ENMT: Moist mucous membranes, Good dentition, No lesions  NECK: Supple, No JVD, Normal thyroid  NERVOUS SYSTEM:  Alert & Oriented X3, Good concentration; Motor Strength 5/5 B/L upper and lower extremities; DTRs 2+ intact and symmetric  CHEST/LUNG: Clear to auscultation bilaterally; No rales, rhonchi, wheezing, or rubs  HEART: Regular rate and rhythm; No murmurs, rubs, or gallops  ABDOMEN: Soft, Nontender, Nondistended; Bowel sounds present  EXTREMITIES:  2+ Peripheral Pulses, No clubbing, cyanosis, or edema  LYMPH: No lymphadenopathy noted  SKIN: No rashes or lesions    LABS:                        10.2   4.77  )-----------( 180      ( 2021 05:41 )             30.3     07-23    134<L>  |  103  |  57<H>  ----------------------------<  136<H>  4.7   |  22  |  1.8<H>    Ca    8.8      2021 05:41  Mg     2.4     07-23    TPro  5.5<L>  /  Alb  3.8  /  TBili  0.3  /  DBili  x   /  AST  9   /  ALT  9   /  AlkPhos  60  -    PT/INR - ( 2021 16:45 )   PT: 11.60 sec;   INR: 1.01 ratio         PTT - ( 2021 16:45 )  PTT:25.2 sec  Urinalysis Basic - ( 2021 18:00 )    Color: Yellow / Appearance: Clear / S.015 / pH: x  Gluc: x / Ketone: Negative  / Bili: Negative / Urobili: 0.2 mg/dL   Blood: x / Protein: Negative mg/dL / Nitrite: Negative   Leuk Esterase: Negative / RBC: x / WBC x   Sq Epi: x / Non Sq Epi: x / Bacteria: x      
Patient is a 65y old  Male who presents with a chief complaint of possible seizure / CVA (2021 14:32)      Over Night Events:  Patient seen and examined.   fee better no seizure activity over night     ROS:  See HPI    PHYSICAL EXAM    ICU Vital Signs Last 24 Hrs  T(C): 36.4 (2021 23:00), Max: 36.5 (2021 15:10)  T(F): 97.6 (2021 23:00), Max: 97.7 (2021 15:10)  HR: 64 (2021 06:00) (54 - 72)  BP: 118/58 (2021 06:00) (90/54 - 145/81)  BP(mean): 83 (2021 06:00) (66 - 103)  ABP: --  ABP(mean): --  RR: 20 (2021 06:00) (16 - 20)  SpO2: 99% (2021 06:00) (94% - 100%)      General: Aox3  HEENT: camilla               Lymph Nodes: NO cervical LN   Lungs: Bilateral BS  Cardiovascular: Regular   Abdomen: Soft, Positive BS  Extremities: No clubbing   Skin: warm   Neurological: no focal   Musculoskeletal: move all ext     I&O's Detail    2021 07:01  -  2021 07:00  --------------------------------------------------------  IN:    Lactated Ringers: 900 mL    Lactated Ringers Bolus: 1000 mL    Oral Fluid: 200 mL  Total IN: 2100 mL    OUT:    Voided (mL): 1625 mL  Total OUT: 1625 mL    Total NET: 475 mL      2021 07:01  -  2021 06:53  --------------------------------------------------------  IN:    Lactated Ringers: 2300 mL  Total IN: 2300 mL    OUT:    Voided (mL): 1875 mL  Total OUT: 1875 mL    Total NET: 425 mL          LABS:                          10.2   4.77  )-----------( 180      ( 2021 05:41 )             30.3         2021 05:41    134    |  103    |  57     ----------------------------<  136    4.7     |  22     |  1.8      Ca    8.8        2021 05:41  Mg     2.4       2021 05:41                                               PT/INR - ( 2021 16:45 )   PT: 11.60 sec;   INR: 1.01 ratio         PTT - ( 2021 16:45 )  PTT:25.2 sec                                       Urinalysis Basic - ( 2021 18:00 )    Color: Yellow / Appearance: Clear / S.015 / pH: x  Gluc: x / Ketone: Negative  / Bili: Negative / Urobili: 0.2 mg/dL   Blood: x / Protein: Negative mg/dL / Nitrite: Negative   Leuk Esterase: Negative / RBC: x / WBC x   Sq Epi: x / Non Sq Epi: x / Bacteria: x        Lactate, Blood: 1.4 mmol/L (21 @ 16:45)    CARDIAC MARKERS ( 2021 16:45 )  x     / <0.01 ng/mL / x     / x     / x                                                                                                                                                 MEDICATIONS  (STANDING):  chlorhexidine 4% Liquid 1 Application(s) Topical <User Schedule>  heparin   Injectable 5000 Unit(s) SubCutaneous every 8 hours  lactated ringers. 1000 milliLiter(s) (100 mL/Hr) IV Continuous <Continuous>    MEDICATIONS  (PRN):          Xrays:  TLC:  OG:  ET tube:                                                                                       ECHO:  CAM ICU:

## 2021-07-24 NOTE — PROGRESS NOTE ADULT - ASSESSMENT
IMPRESSION:  dizziness/ hypotension/   acute renal failure  dehydration  HO HTN     PLAN:    CNS: Avoid sedatives, as per neurology no further intervention     HEENT: Oral care    PULMONARY:  HOB @ 45 degrees.  Aspiration precautions     CARDIOVASCULAR: Goal MAP >65, ECHO.   GI: GI prophylaxis.  Feeding.  Bowel regimen     RENAL:  Follow up lytes.  Correct as needed    INFECTIOUS DISEASE: Follow up cultures    HEMATOLOGICAL:  DVT prophylaxis.    ENDOCRINE:  Follow up FS.  Insulin protocol if needed.    MUSCULOSKELETAL: OOBTC/PT/OT    if morning cbc , bmp stable downgrade to floor

## 2021-07-25 ENCOUNTER — INPATIENT (INPATIENT)
Facility: HOSPITAL | Age: 66
LOS: 1 days | Discharge: HOME | End: 2021-07-27
Attending: SURGERY | Admitting: SURGERY
Payer: COMMERCIAL

## 2021-07-25 ENCOUNTER — EMERGENCY (EMERGENCY)
Facility: HOSPITAL | Age: 66
LOS: 0 days | Discharge: HOME | End: 2021-07-25
Attending: EMERGENCY MEDICINE | Admitting: EMERGENCY MEDICINE
Payer: MEDICARE

## 2021-07-25 VITALS
DIASTOLIC BLOOD PRESSURE: 76 MMHG | HEIGHT: 68 IN | HEART RATE: 73 BPM | TEMPERATURE: 98 F | OXYGEN SATURATION: 99 % | RESPIRATION RATE: 16 BRPM | SYSTOLIC BLOOD PRESSURE: 138 MMHG | WEIGHT: 195.11 LBS

## 2021-07-25 VITALS
SYSTOLIC BLOOD PRESSURE: 200 MMHG | TEMPERATURE: 99 F | DIASTOLIC BLOOD PRESSURE: 93 MMHG | HEIGHT: 68 IN | WEIGHT: 195.11 LBS | RESPIRATION RATE: 18 BRPM | HEART RATE: 109 BPM | OXYGEN SATURATION: 98 %

## 2021-07-25 VITALS
HEART RATE: 67 BPM | OXYGEN SATURATION: 96 % | RESPIRATION RATE: 18 BRPM | DIASTOLIC BLOOD PRESSURE: 73 MMHG | SYSTOLIC BLOOD PRESSURE: 113 MMHG

## 2021-07-25 DIAGNOSIS — T78.40XA ALLERGY, UNSPECIFIED, INITIAL ENCOUNTER: ICD-10-CM

## 2021-07-25 DIAGNOSIS — L53.9 ERYTHEMATOUS CONDITION, UNSPECIFIED: ICD-10-CM

## 2021-07-25 DIAGNOSIS — Z98.890 OTHER SPECIFIED POSTPROCEDURAL STATES: Chronic | ICD-10-CM

## 2021-07-25 DIAGNOSIS — K13.0 DISEASES OF LIPS: ICD-10-CM

## 2021-07-25 DIAGNOSIS — Z91.048 OTHER NONMEDICINAL SUBSTANCE ALLERGY STATUS: ICD-10-CM

## 2021-07-25 DIAGNOSIS — Z79.899 OTHER LONG TERM (CURRENT) DRUG THERAPY: ICD-10-CM

## 2021-07-25 DIAGNOSIS — I10 ESSENTIAL (PRIMARY) HYPERTENSION: ICD-10-CM
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
## 2021-07-25 DIAGNOSIS — Z85.46 PERSONAL HISTORY OF MALIGNANT NEOPLASM OF PROSTATE: ICD-10-CM

## 2021-07-25 DIAGNOSIS — Z87.891 PERSONAL HISTORY OF NICOTINE DEPENDENCE: ICD-10-CM

## 2021-07-25 DIAGNOSIS — M79.89 OTHER SPECIFIED SOFT TISSUE DISORDERS: ICD-10-CM

## 2021-07-25 PROBLEM — C61 MALIGNANT NEOPLASM OF PROSTATE: Chronic | Status: ACTIVE | Noted: 2021-07-22

## 2021-07-25 LAB
ALBUMIN SERPL ELPH-MCNC: 4.3 G/DL — SIGNIFICANT CHANGE UP (ref 3.5–5.2)
ALP SERPL-CCNC: 63 U/L — SIGNIFICANT CHANGE UP (ref 30–115)
ALT FLD-CCNC: 15 U/L — SIGNIFICANT CHANGE UP (ref 0–41)
ANION GAP SERPL CALC-SCNC: 18 MMOL/L — HIGH (ref 7–14)
AST SERPL-CCNC: 21 U/L — SIGNIFICANT CHANGE UP (ref 0–41)
BASOPHILS # BLD AUTO: 0.01 K/UL — SIGNIFICANT CHANGE UP (ref 0–0.2)
BASOPHILS NFR BLD AUTO: 0.1 % — SIGNIFICANT CHANGE UP (ref 0–1)
BILIRUB SERPL-MCNC: 0.6 MG/DL — SIGNIFICANT CHANGE UP (ref 0.2–1.2)
BUN SERPL-MCNC: 13 MG/DL — SIGNIFICANT CHANGE UP (ref 10–20)
CALCIUM SERPL-MCNC: 8.8 MG/DL — SIGNIFICANT CHANGE UP (ref 8.5–10.1)
CHLORIDE SERPL-SCNC: 96 MMOL/L — LOW (ref 98–110)
CK SERPL-CCNC: 93 U/L — SIGNIFICANT CHANGE UP (ref 0–225)
CO2 SERPL-SCNC: 18 MMOL/L — SIGNIFICANT CHANGE UP (ref 17–32)
CREAT SERPL-MCNC: 1.1 MG/DL — SIGNIFICANT CHANGE UP (ref 0.7–1.5)
EOSINOPHIL # BLD AUTO: 0 K/UL — SIGNIFICANT CHANGE UP (ref 0–0.7)
EOSINOPHIL NFR BLD AUTO: 0 % — SIGNIFICANT CHANGE UP (ref 0–8)
ETHANOL SERPL-MCNC: <10 MG/DL — SIGNIFICANT CHANGE UP
GLUCOSE SERPL-MCNC: 143 MG/DL — HIGH (ref 70–99)
HCT VFR BLD CALC: 37.2 % — LOW (ref 42–52)
HGB BLD-MCNC: 12.6 G/DL — LOW (ref 14–18)
IMM GRANULOCYTES NFR BLD AUTO: 0.8 % — HIGH (ref 0.1–0.3)
LYMPHOCYTES # BLD AUTO: 0.63 K/UL — LOW (ref 1.2–3.4)
LYMPHOCYTES # BLD AUTO: 8.1 % — LOW (ref 20.5–51.1)
MCHC RBC-ENTMCNC: 30.5 PG — SIGNIFICANT CHANGE UP (ref 27–31)
MCHC RBC-ENTMCNC: 33.9 G/DL — SIGNIFICANT CHANGE UP (ref 32–37)
MCV RBC AUTO: 90.1 FL — SIGNIFICANT CHANGE UP (ref 80–94)
MONOCYTES # BLD AUTO: 0.17 K/UL — SIGNIFICANT CHANGE UP (ref 0.1–0.6)
MONOCYTES NFR BLD AUTO: 2.2 % — SIGNIFICANT CHANGE UP (ref 1.7–9.3)
NEUTROPHILS # BLD AUTO: 6.88 K/UL — HIGH (ref 1.4–6.5)
NEUTROPHILS NFR BLD AUTO: 88.8 % — HIGH (ref 42.2–75.2)
NRBC # BLD: 0 /100 WBCS — SIGNIFICANT CHANGE UP (ref 0–0)
PLATELET # BLD AUTO: 204 K/UL — SIGNIFICANT CHANGE UP (ref 130–400)
POTASSIUM SERPL-MCNC: 5 MMOL/L — SIGNIFICANT CHANGE UP (ref 3.5–5)
POTASSIUM SERPL-SCNC: 5 MMOL/L — SIGNIFICANT CHANGE UP (ref 3.5–5)
PROT SERPL-MCNC: 6.4 G/DL — SIGNIFICANT CHANGE UP (ref 6–8)
RAPID RVP RESULT: SIGNIFICANT CHANGE UP
RBC # BLD: 4.13 M/UL — LOW (ref 4.7–6.1)
RBC # FLD: 12.8 % — SIGNIFICANT CHANGE UP (ref 11.5–14.5)
SARS-COV-2 RNA SPEC QL NAA+PROBE: SIGNIFICANT CHANGE UP
SODIUM SERPL-SCNC: 132 MMOL/L — LOW (ref 135–146)
TROPONIN T SERPL-MCNC: <0.01 NG/ML — SIGNIFICANT CHANGE UP
WBC # BLD: 7.75 K/UL — SIGNIFICANT CHANGE UP (ref 4.8–10.8)
WBC # FLD AUTO: 7.75 K/UL — SIGNIFICANT CHANGE UP (ref 4.8–10.8)

## 2021-07-25 PROCEDURE — 93010 ELECTROCARDIOGRAM REPORT: CPT | Mod: 77

## 2021-07-25 PROCEDURE — 99284 EMERGENCY DEPT VISIT MOD MDM: CPT

## 2021-07-25 PROCEDURE — 71045 X-RAY EXAM CHEST 1 VIEW: CPT | Mod: 26

## 2021-07-25 PROCEDURE — 99291 CRITICAL CARE FIRST HOUR: CPT

## 2021-07-25 PROCEDURE — 93010 ELECTROCARDIOGRAM REPORT: CPT

## 2021-07-25 PROCEDURE — 71260 CT THORAX DX C+: CPT | Mod: 26,MA

## 2021-07-25 PROCEDURE — 72125 CT NECK SPINE W/O DYE: CPT | Mod: 26,MA

## 2021-07-25 PROCEDURE — 74177 CT ABD & PELVIS W/CONTRAST: CPT | Mod: 26,MA

## 2021-07-25 PROCEDURE — 70450 CT HEAD/BRAIN W/O DYE: CPT | Mod: 26,MA

## 2021-07-25 PROCEDURE — 72170 X-RAY EXAM OF PELVIS: CPT | Mod: 26

## 2021-07-25 RX ORDER — SODIUM CHLORIDE 9 MG/ML
1000 INJECTION, SOLUTION INTRAVENOUS
Refills: 0 | Status: DISCONTINUED | OUTPATIENT
Start: 2021-07-25 | End: 2021-07-26

## 2021-07-25 RX ORDER — FUROSEMIDE 40 MG
0.5 TABLET ORAL
Qty: 0 | Refills: 0 | DISCHARGE

## 2021-07-25 RX ORDER — TADALAFIL 10 MG/1
1 TABLET, FILM COATED ORAL
Qty: 0 | Refills: 0 | DISCHARGE

## 2021-07-25 RX ORDER — LIDOCAINE 4 G/100G
1 CREAM TOPICAL DAILY
Refills: 0 | Status: DISCONTINUED | OUTPATIENT
Start: 2021-07-25 | End: 2021-07-27

## 2021-07-25 RX ORDER — DIPHENHYDRAMINE HCL 50 MG
50 CAPSULE ORAL ONCE
Refills: 0 | Status: DISCONTINUED | OUTPATIENT
Start: 2021-07-25 | End: 2021-07-25

## 2021-07-25 RX ORDER — ACETAMINOPHEN 500 MG
1000 TABLET ORAL ONCE
Refills: 0 | Status: COMPLETED | OUTPATIENT
Start: 2021-07-25 | End: 2021-07-25

## 2021-07-25 RX ORDER — FAMOTIDINE 10 MG/ML
20 INJECTION INTRAVENOUS ONCE
Refills: 0 | Status: COMPLETED | OUTPATIENT
Start: 2021-07-25 | End: 2021-07-25

## 2021-07-25 RX ORDER — DIPHENHYDRAMINE HCL 50 MG
25 CAPSULE ORAL ONCE
Refills: 0 | Status: COMPLETED | OUTPATIENT
Start: 2021-07-25 | End: 2021-07-25

## 2021-07-25 RX ORDER — DEXAMETHASONE 0.5 MG/5ML
10 ELIXIR ORAL ONCE
Refills: 0 | Status: COMPLETED | OUTPATIENT
Start: 2021-07-25 | End: 2021-07-25

## 2021-07-25 RX ORDER — DEXTROSE 50 % IN WATER 50 %
15 SYRINGE (ML) INTRAVENOUS ONCE
Refills: 0 | Status: DISCONTINUED | OUTPATIENT
Start: 2021-07-25 | End: 2021-07-25

## 2021-07-25 RX ORDER — LISINOPRIL 2.5 MG/1
1 TABLET ORAL
Qty: 0 | Refills: 0 | DISCHARGE

## 2021-07-25 RX ORDER — IBUPROFEN 200 MG
400 TABLET ORAL EVERY 6 HOURS
Refills: 0 | Status: DISCONTINUED | OUTPATIENT
Start: 2021-07-25 | End: 2021-07-25

## 2021-07-25 RX ORDER — OXYCODONE HYDROCHLORIDE 5 MG/1
5 TABLET ORAL EVERY 6 HOURS
Refills: 0 | Status: DISCONTINUED | OUTPATIENT
Start: 2021-07-25 | End: 2021-07-27

## 2021-07-25 RX ORDER — DEXTROSE 50 % IN WATER 50 %
50 SYRINGE (ML) INTRAVENOUS ONCE
Refills: 0 | Status: COMPLETED | OUTPATIENT
Start: 2021-07-25 | End: 2021-07-25

## 2021-07-25 RX ORDER — GLUCAGON INJECTION, SOLUTION 0.5 MG/.1ML
1 INJECTION, SOLUTION SUBCUTANEOUS ONCE
Refills: 0 | Status: DISCONTINUED | OUTPATIENT
Start: 2021-07-25 | End: 2021-07-25

## 2021-07-25 RX ORDER — DEXTROSE 50 % IN WATER 50 %
12.5 SYRINGE (ML) INTRAVENOUS ONCE
Refills: 0 | Status: DISCONTINUED | OUTPATIENT
Start: 2021-07-25 | End: 2021-07-25

## 2021-07-25 RX ORDER — ACETAMINOPHEN 500 MG
650 TABLET ORAL EVERY 6 HOURS
Refills: 0 | Status: DISCONTINUED | OUTPATIENT
Start: 2021-07-25 | End: 2021-07-25

## 2021-07-25 RX ORDER — DEXTROSE 50 % IN WATER 50 %
25 SYRINGE (ML) INTRAVENOUS ONCE
Refills: 0 | Status: DISCONTINUED | OUTPATIENT
Start: 2021-07-25 | End: 2021-07-25

## 2021-07-25 RX ORDER — SODIUM CHLORIDE 9 MG/ML
1000 INJECTION INTRAMUSCULAR; INTRAVENOUS; SUBCUTANEOUS ONCE
Refills: 0 | Status: COMPLETED | OUTPATIENT
Start: 2021-07-25 | End: 2021-07-25

## 2021-07-25 RX ORDER — SENNA PLUS 8.6 MG/1
2 TABLET ORAL AT BEDTIME
Refills: 0 | Status: DISCONTINUED | OUTPATIENT
Start: 2021-07-25 | End: 2021-07-27

## 2021-07-25 RX ORDER — LISINOPRIL 2.5 MG/1
10 TABLET ORAL DAILY
Refills: 0 | Status: DISCONTINUED | OUTPATIENT
Start: 2021-07-25 | End: 2021-07-25

## 2021-07-25 RX ORDER — DEXTROSE 50 % IN WATER 50 %
25 SYRINGE (ML) INTRAVENOUS ONCE
Refills: 0 | Status: DISCONTINUED | OUTPATIENT
Start: 2021-07-25 | End: 2021-07-26

## 2021-07-25 RX ORDER — EPINEPHRINE 0.3 MG/.3ML
0.3 INJECTION INTRAMUSCULAR; SUBCUTANEOUS
Qty: 1 | Refills: 0
Start: 2021-07-25

## 2021-07-25 RX ORDER — EPINEPHRINE 0.3 MG/.3ML
0.3 INJECTION INTRAMUSCULAR; SUBCUTANEOUS ONCE
Refills: 0 | Status: COMPLETED | OUTPATIENT
Start: 2021-07-25 | End: 2021-07-25

## 2021-07-25 RX ORDER — METOPROLOL TARTRATE 50 MG
5 TABLET ORAL ONCE
Refills: 0 | Status: COMPLETED | OUTPATIENT
Start: 2021-07-25 | End: 2021-07-25

## 2021-07-25 RX ORDER — ACETAMINOPHEN 500 MG
650 TABLET ORAL EVERY 6 HOURS
Refills: 0 | Status: DISCONTINUED | OUTPATIENT
Start: 2021-07-25 | End: 2021-07-27

## 2021-07-25 RX ORDER — PANTOPRAZOLE SODIUM 20 MG/1
40 TABLET, DELAYED RELEASE ORAL
Refills: 0 | Status: DISCONTINUED | OUTPATIENT
Start: 2021-07-25 | End: 2021-07-27

## 2021-07-25 RX ADMIN — SODIUM CHLORIDE 1000 MILLILITER(S): 9 INJECTION INTRAMUSCULAR; INTRAVENOUS; SUBCUTANEOUS at 01:46

## 2021-07-25 RX ADMIN — Medication 10 MILLIGRAM(S): at 01:46

## 2021-07-25 RX ADMIN — Medication 50 MILLILITER(S): at 15:58

## 2021-07-25 RX ADMIN — Medication 5 MILLIGRAM(S): at 22:36

## 2021-07-25 RX ADMIN — Medication 400 MILLIGRAM(S): at 23:37

## 2021-07-25 RX ADMIN — EPINEPHRINE 0.3 MILLIGRAM(S): 0.3 INJECTION INTRAMUSCULAR; SUBCUTANEOUS at 01:42

## 2021-07-25 RX ADMIN — OXYCODONE HYDROCHLORIDE 5 MILLIGRAM(S): 5 TABLET ORAL at 20:01

## 2021-07-25 RX ADMIN — FAMOTIDINE 100 MILLIGRAM(S): 10 INJECTION INTRAVENOUS at 01:46

## 2021-07-25 RX ADMIN — Medication 25 MILLIGRAM(S): at 02:09

## 2021-07-25 RX ADMIN — Medication 25 MILLIGRAM(S): at 23:48
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
## 2021-07-25 NOTE — ED ADULT NURSE NOTE - NSIMPLEMENTINTERV_GEN_ALL_ED
Implemented All Universal Safety Interventions:  [place] to call system. Call bell, personal items and telephone within reach. Instruct patient to call for assistance. Room bathroom lighting operational. Non-slip footwear when patient is off stretcher. Physically safe environment: no spills, clutter or unnecessary equipment. Stretcher in lowest position, wheels locked, appropriate side rails in place.
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
## 2021-07-25 NOTE — ED PROVIDER NOTE - OBJECTIVE STATEMENT
66 yo male hx of HTN/Prostate Cancer present allergic reaction after he was bit by a ? mosquito vs other insect to right arm 2 hours ago. right started swelling and spread to his lip swelling and face. Also noted voice changes and funny sensation to his throat. Took Benadryl 50mg without relief about 1 hour PTA. Denies chest tightness/SOB/ abdominal pain/nausea/vomiting and diarrhea.
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
## 2021-07-25 NOTE — H&P ADULT - NSHPPHYSICALEXAM_GEN_ALL_CORE
Primary Survey:    A - airway intact  B - bilateral breath sounds and good chest rise  C - palpable pulses in all extremities  D - GCS 15 on arrival, JAVIER  Exposure obtained    Vital Signs Last 24 Hrs  T(C): 37 (25 Jul 2021 15:43), Max: 37 (25 Jul 2021 15:43)  T(F): 98.6 (25 Jul 2021 15:43), Max: 98.6 (25 Jul 2021 15:43)  HR: 109 (25 Jul 2021 15:43) (60 - 109)  BP: 200/93 (25 Jul 2021 15:43) (92/50 - 200/93)  RR: 18 (25 Jul 2021 15:43) (16 - 18)  SpO2: 98% (25 Jul 2021 15:43) (93% - 99%)    Secondary Survey:   General: NAD  HEENT: Normocephalic, atraumatic, EOMI, PEERLA. No scalp lacerations.   Neck: Soft, midline trachea. No c-spine tenderness.  Chest: Minimal anterior chest wall tenderness to palpation. No subcutaneous emphysema.  Cardiac: S1, S2, RRR.  Respiratory: Bilateral breath sounds, clear and equal bilaterally.  Abdomen: Soft, non-distended, non-tender, no rebound, no guarding.  Groin: Normal appearing, pelvis stable.  Ext:  Moving b/l upper and lower extremities. Palpable Radial b/l UE, b/l DP palpable in LE.   Back: No T/L/S spine tenderness, No palpable runoff/stepoff/deformity.  Rectal: No savage blood, YSABEL with good tone.    ACCESS / DEVICES:  [ X ] Peripheral IV  [ ] Central Venous Line	[ ] R	[ ] L	[ ] IJ	[ ] Fem	[ ] SC	Placed:   [ ] Arterial Line		[ ] R	[ ] L	[ ] Fem	[ ] Rad	[ ] Ax	Placed:   [ ] PICC:					[ ] Mediport  [ ] Urinary Catheter,  Date Placed:   [ ] Chest tube: [ ] Right, [ ] Left  [ ] STEVE/Colt Drains

## 2021-07-25 NOTE — H&P ADULT - NSHPLABSRESULTS_GEN_ALL_CORE
LABS:  CAPILLARY BLOOD GLUCOSE  POCT Blood Glucose.: 249 mg/dL (25 Jul 2021 15:56)               12.6   7.75  )-----------( 204      ( 25 Jul 2021 16:17 )             37.2     07-25  132<L>  |  96<L>  |  13  ----------------------------<  143<H>  5.0   |  18  |  1.1    Ca    8.8      25 Jul 2021 16:17  Mg     1.9     07-24    TPro  6.4  /  Alb  4.3  /  TBili  0.6  /  DBili  x   /  AST  21  /  ALT  15  /  AlkPhos  63  07-25    CARDIAC MARKERS ( 25 Jul 2021 18:01 )  x     / <0.01 ng/mL / x     / x     / x      CARDIAC MARKERS ( 25 Jul 2021 18:00 )  x     / x     / 93 U/L / x     / x        Alcohol, Blood: <10 mg/dL (07-25-21 @ 16:17)      IMAGING:  < from: CT Head No Cont (07.25.21 @ 16:18) >  IMPRESSION:    No evidence of acute intracranial pathology.  < end of copied text >    < from: CT Cervical Spine No Cont (07.25.21 @ 16:20) >  Impression:    No acute fracture or subluxation of the cervical spine.  < end of copied text >    < from: CT Abdomen and Pelvis w/ IV Cont (07.25.21 @ 16:27) >  IMPRESSION:    1. Oblique displaced sternal fracture with anterior mediastinal hyperdense fluid consistent with hemorrhage.  2. Mildly displaced fractures anterior right 2nd, 3rd, 4th and 5th. Adjacent right upper lobe groundglass consolidation, possibly reflecting pulmonary contusion.  3. Right pelvic sidewall 3.6 cm indeterminate cystic structure.  4. Ascending thoracic aortic aneurysm measuring 4.2 cm. Dilated main pulmonary artery.  < end of copied text >    < from: Xray Pelvis AP only (07.25.21 @ 16:57) >  Impression:    No acute fracture or dislocation.  < end of copied text >

## 2021-07-25 NOTE — ED PROVIDER NOTE - NS ED ROS FT
Constitutional: no fever, chills, no recent weight loss, change in appetite or malaise  Eyes: no redness/discharge/pain/vision changes  ENT: see HPI no rhinorrhea/ear pain/sore throat  Cardiac: No chest pain, SOB or edema.  Respiratory: No cough or respiratory distress  GI: No nausea, vomiting, diarrhea or abdominal pain.  : No dysuria, frequency, urgency or hematuria  MS: no pain to back or extremities, no loss of ROM, no weakness  Neuro: No headache or weakness. No LOC.  Skin: see HPI  Allergy: See HPI  Endocrine: No history of thyroid disease or diabetes.
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
## 2021-07-25 NOTE — ED PROVIDER NOTE - PATIENT PORTAL LINK FT
You can access the FollowMyHealth Patient Portal offered by [facility] by registering at the following website: [email or web address]. By joining [employer]’s FollowMyHealth portal, you will also be able to view your health information using other applications (apps) compatible with our system.
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
## 2021-07-25 NOTE — ED PROVIDER NOTE - ATTENDING CONTRIBUTION TO CARE
I personally evaluated the patient. I reviewed the Resident’s or Physician Assistant’s note (as assigned above), and agree with the findings and plan except as documented in my note.  Chart reviewed.  States he got bit by insect, presents with right hand swelling associated with lip and facial swelling.  Exam shows alert patient in no distress, HEENT mild uvular swelling, lungs clear, RR S1S2, abdomen soft NT +BS, +right hand swelling.
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
## 2021-07-25 NOTE — ED PROVIDER NOTE - CARE PROVIDER_API CALL
Charisma Sanchez)  Allergy and Immunology; Internal Medicine  [address] [place] [address]  Phone: [phone]  Fax: [phone]  Follow Up Time:
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
## 2021-07-25 NOTE — ED PROVIDER NOTE - PHYSICAL EXAMINATION
CONSTITUTIONAL: Well-appearing; well-nourished; in no apparent distress.   EYES: PERRL; EOM intact.   ENT: + mild tongue and uvula swelling + hoarseness   NECK: Supple; non-tender; no cervical lymphadenopathy. No JVD.   CARDIOVASCULAR: Normal S1, S2; no murmurs, rubs, or gallops.   RESPIRATORY: Normal chest excursion with respiration; breath sounds clear and equal bilaterally; no wheezes, rhonchi, or rales.  GI/: Normal bowel sounds; non-distended; non-tender; no palpable organomegaly.   MS: + right arm/hand swelling.   SKIN: + erythema/swelling to right arm and face.  NEURO/PSYCH: A & O x 4; grossly unremarkable.

## 2021-07-25 NOTE — ED PROVIDER NOTE - CLINICAL SUMMARY MEDICAL DECISION MAKING FREE TEXT BOX
Given epi, Benadryl, Decadron, Pepcid with improvement.  Observed in ED and remained stable.  Will D/C to follow up with PCP.

## 2021-07-26 LAB
A1C WITH ESTIMATED AVERAGE GLUCOSE RESULT: 5.3 % — SIGNIFICANT CHANGE UP (ref 4–5.6)
ANION GAP SERPL CALC-SCNC: 11 MMOL/L — SIGNIFICANT CHANGE UP (ref 7–14)
BASOPHILS # BLD AUTO: 0.02 K/UL — SIGNIFICANT CHANGE UP (ref 0–0.2)
BASOPHILS NFR BLD AUTO: 0.2 % — SIGNIFICANT CHANGE UP (ref 0–1)
BUN SERPL-MCNC: 13 MG/DL — SIGNIFICANT CHANGE UP (ref 10–20)
CALCIUM SERPL-MCNC: 8.4 MG/DL — LOW (ref 8.5–10.1)
CHLORIDE SERPL-SCNC: 103 MMOL/L — SIGNIFICANT CHANGE UP (ref 98–110)
CK MB CFR SERPL CALC: 2.1 NG/ML — SIGNIFICANT CHANGE UP (ref 0.6–6.3)
CK MB CFR SERPL CALC: 2.2 NG/ML — SIGNIFICANT CHANGE UP (ref 0.6–6.3)
CK SERPL-CCNC: 57 U/L — SIGNIFICANT CHANGE UP (ref 0–225)
CK SERPL-CCNC: 60 U/L — SIGNIFICANT CHANGE UP (ref 0–225)
CO2 SERPL-SCNC: 22 MMOL/L — SIGNIFICANT CHANGE UP (ref 17–32)
COVID-19 SPIKE DOMAIN AB INTERP: POSITIVE
COVID-19 SPIKE DOMAIN ANTIBODY RESULT: >250 U/ML — HIGH
CREAT SERPL-MCNC: 1.1 MG/DL — SIGNIFICANT CHANGE UP (ref 0.7–1.5)
EOSINOPHIL # BLD AUTO: 0.01 K/UL — SIGNIFICANT CHANGE UP (ref 0–0.7)
EOSINOPHIL NFR BLD AUTO: 0.1 % — SIGNIFICANT CHANGE UP (ref 0–8)
ESTIMATED AVERAGE GLUCOSE: 105 MG/DL — SIGNIFICANT CHANGE UP (ref 68–114)
GLUCOSE BLDC GLUCOMTR-MCNC: 123 MG/DL — HIGH (ref 70–99)
GLUCOSE BLDC GLUCOMTR-MCNC: 125 MG/DL — HIGH (ref 70–99)
GLUCOSE BLDC GLUCOMTR-MCNC: 147 MG/DL — HIGH (ref 70–99)
GLUCOSE BLDC GLUCOMTR-MCNC: 40 MG/DL — CRITICAL LOW (ref 70–99)
GLUCOSE SERPL-MCNC: 119 MG/DL — HIGH (ref 70–99)
HCT VFR BLD CALC: 31.9 % — LOW (ref 42–52)
HCT VFR BLD CALC: 33.2 % — LOW (ref 42–52)
HCT VFR BLD CALC: 34.2 % — LOW (ref 42–52)
HCT VFR BLD CALC: 34.8 % — LOW (ref 42–52)
HCT VFR BLD CALC: 36.6 % — LOW (ref 42–52)
HGB BLD-MCNC: 10.8 G/DL — LOW (ref 14–18)
HGB BLD-MCNC: 11.3 G/DL — LOW (ref 14–18)
HGB BLD-MCNC: 11.5 G/DL — LOW (ref 14–18)
HGB BLD-MCNC: 11.8 G/DL — LOW (ref 14–18)
HGB BLD-MCNC: 12.3 G/DL — LOW (ref 14–18)
IMM GRANULOCYTES NFR BLD AUTO: 0.4 % — HIGH (ref 0.1–0.3)
LYMPHOCYTES # BLD AUTO: 1.11 K/UL — LOW (ref 1.2–3.4)
LYMPHOCYTES # BLD AUTO: 11.5 % — LOW (ref 20.5–51.1)
MAGNESIUM SERPL-MCNC: 1.4 MG/DL — LOW (ref 1.8–2.4)
MCHC RBC-ENTMCNC: 30.6 PG — SIGNIFICANT CHANGE UP (ref 27–31)
MCHC RBC-ENTMCNC: 31 PG — SIGNIFICANT CHANGE UP (ref 27–31)
MCHC RBC-ENTMCNC: 31 PG — SIGNIFICANT CHANGE UP (ref 27–31)
MCHC RBC-ENTMCNC: 31.4 PG — HIGH (ref 27–31)
MCHC RBC-ENTMCNC: 31.6 PG — HIGH (ref 27–31)
MCHC RBC-ENTMCNC: 33.6 G/DL — SIGNIFICANT CHANGE UP (ref 32–37)
MCHC RBC-ENTMCNC: 33.6 G/DL — SIGNIFICANT CHANGE UP (ref 32–37)
MCHC RBC-ENTMCNC: 33.9 G/DL — SIGNIFICANT CHANGE UP (ref 32–37)
MCHC RBC-ENTMCNC: 33.9 G/DL — SIGNIFICANT CHANGE UP (ref 32–37)
MCHC RBC-ENTMCNC: 34 G/DL — SIGNIFICANT CHANGE UP (ref 32–37)
MCV RBC AUTO: 90.2 FL — SIGNIFICANT CHANGE UP (ref 80–94)
MCV RBC AUTO: 91.2 FL — SIGNIFICANT CHANGE UP (ref 80–94)
MCV RBC AUTO: 92.2 FL — SIGNIFICANT CHANGE UP (ref 80–94)
MCV RBC AUTO: 93.3 FL — SIGNIFICANT CHANGE UP (ref 80–94)
MCV RBC AUTO: 93.4 FL — SIGNIFICANT CHANGE UP (ref 80–94)
MONOCYTES # BLD AUTO: 0.62 K/UL — HIGH (ref 0.1–0.6)
MONOCYTES NFR BLD AUTO: 6.4 % — SIGNIFICANT CHANGE UP (ref 1.7–9.3)
NEUTROPHILS # BLD AUTO: 7.83 K/UL — HIGH (ref 1.4–6.5)
NEUTROPHILS NFR BLD AUTO: 81.4 % — HIGH (ref 42.2–75.2)
NRBC # BLD: 0 /100 WBCS — SIGNIFICANT CHANGE UP (ref 0–0)
PHOSPHATE SERPL-MCNC: 3 MG/DL — SIGNIFICANT CHANGE UP (ref 2.1–4.9)
PLATELET # BLD AUTO: 220 K/UL — SIGNIFICANT CHANGE UP (ref 130–400)
PLATELET # BLD AUTO: 227 K/UL — SIGNIFICANT CHANGE UP (ref 130–400)
PLATELET # BLD AUTO: 229 K/UL — SIGNIFICANT CHANGE UP (ref 130–400)
PLATELET # BLD AUTO: 247 K/UL — SIGNIFICANT CHANGE UP (ref 130–400)
PLATELET # BLD AUTO: 251 K/UL — SIGNIFICANT CHANGE UP (ref 130–400)
POTASSIUM SERPL-MCNC: 4.5 MMOL/L — SIGNIFICANT CHANGE UP (ref 3.5–5)
POTASSIUM SERPL-SCNC: 4.5 MMOL/L — SIGNIFICANT CHANGE UP (ref 3.5–5)
RBC # BLD: 3.42 M/UL — LOW (ref 4.7–6.1)
RBC # BLD: 3.64 M/UL — LOW (ref 4.7–6.1)
RBC # BLD: 3.71 M/UL — LOW (ref 4.7–6.1)
RBC # BLD: 3.86 M/UL — LOW (ref 4.7–6.1)
RBC # BLD: 3.92 M/UL — LOW (ref 4.7–6.1)
RBC # FLD: 13 % — SIGNIFICANT CHANGE UP (ref 11.5–14.5)
RBC # FLD: 13 % — SIGNIFICANT CHANGE UP (ref 11.5–14.5)
RBC # FLD: 13.1 % — SIGNIFICANT CHANGE UP (ref 11.5–14.5)
RBC # FLD: 13.2 % — SIGNIFICANT CHANGE UP (ref 11.5–14.5)
RBC # FLD: 13.2 % — SIGNIFICANT CHANGE UP (ref 11.5–14.5)
SARS-COV-2 IGG+IGM SERPL QL IA: >250 U/ML — HIGH
SARS-COV-2 IGG+IGM SERPL QL IA: POSITIVE
SODIUM SERPL-SCNC: 136 MMOL/L — SIGNIFICANT CHANGE UP (ref 135–146)
TROPONIN T SERPL-MCNC: <0.01 NG/ML — SIGNIFICANT CHANGE UP
TROPONIN T SERPL-MCNC: <0.01 NG/ML — SIGNIFICANT CHANGE UP
WBC # BLD: 7.52 K/UL — SIGNIFICANT CHANGE UP (ref 4.8–10.8)
WBC # BLD: 7.78 K/UL — SIGNIFICANT CHANGE UP (ref 4.8–10.8)
WBC # BLD: 8.03 K/UL — SIGNIFICANT CHANGE UP (ref 4.8–10.8)
WBC # BLD: 9.05 K/UL — SIGNIFICANT CHANGE UP (ref 4.8–10.8)
WBC # BLD: 9.63 K/UL — SIGNIFICANT CHANGE UP (ref 4.8–10.8)
WBC # FLD AUTO: 7.52 K/UL — SIGNIFICANT CHANGE UP (ref 4.8–10.8)
WBC # FLD AUTO: 7.78 K/UL — SIGNIFICANT CHANGE UP (ref 4.8–10.8)
WBC # FLD AUTO: 8.03 K/UL — SIGNIFICANT CHANGE UP (ref 4.8–10.8)
WBC # FLD AUTO: 9.05 K/UL — SIGNIFICANT CHANGE UP (ref 4.8–10.8)
WBC # FLD AUTO: 9.63 K/UL — SIGNIFICANT CHANGE UP (ref 4.8–10.8)

## 2021-07-26 PROCEDURE — 93306 TTE W/DOPPLER COMPLETE: CPT | Mod: 26

## 2021-07-26 PROCEDURE — 93010 ELECTROCARDIOGRAM REPORT: CPT

## 2021-07-26 PROCEDURE — 71045 X-RAY EXAM CHEST 1 VIEW: CPT | Mod: 26

## 2021-07-26 PROCEDURE — 99251: CPT

## 2021-07-26 PROCEDURE — 99233 SBSQ HOSP IP/OBS HIGH 50: CPT

## 2021-07-26 RX ORDER — DIPHENHYDRAMINE HCL 50 MG
25 CAPSULE ORAL EVERY 6 HOURS
Refills: 0 | Status: DISCONTINUED | OUTPATIENT
Start: 2021-07-26 | End: 2021-07-27

## 2021-07-26 RX ORDER — HYDROMORPHONE HYDROCHLORIDE 2 MG/ML
0.25 INJECTION INTRAMUSCULAR; INTRAVENOUS; SUBCUTANEOUS ONCE
Refills: 0 | Status: DISCONTINUED | OUTPATIENT
Start: 2021-07-26 | End: 2021-07-26

## 2021-07-26 RX ORDER — MAGNESIUM SULFATE 500 MG/ML
2 VIAL (ML) INJECTION ONCE
Refills: 0 | Status: COMPLETED | OUTPATIENT
Start: 2021-07-26 | End: 2021-07-26

## 2021-07-26 RX ORDER — DIPHENHYDRAMINE HCL 50 MG
25 CAPSULE ORAL ONCE
Refills: 0 | Status: COMPLETED | OUTPATIENT
Start: 2021-07-26 | End: 2021-07-26

## 2021-07-26 RX ORDER — SODIUM CHLORIDE 9 MG/ML
250 INJECTION, SOLUTION INTRAVENOUS ONCE
Refills: 0 | Status: COMPLETED | OUTPATIENT
Start: 2021-07-26 | End: 2021-07-26

## 2021-07-26 RX ORDER — SODIUM CHLORIDE 9 MG/ML
1000 INJECTION, SOLUTION INTRAVENOUS ONCE
Refills: 0 | Status: COMPLETED | OUTPATIENT
Start: 2021-07-26 | End: 2021-07-26

## 2021-07-26 RX ORDER — HYDROMORPHONE HYDROCHLORIDE 2 MG/ML
0.25 INJECTION INTRAMUSCULAR; INTRAVENOUS; SUBCUTANEOUS
Refills: 0 | Status: DISCONTINUED | OUTPATIENT
Start: 2021-07-26 | End: 2021-07-26

## 2021-07-26 RX ORDER — ENOXAPARIN SODIUM 100 MG/ML
40 INJECTION SUBCUTANEOUS DAILY
Refills: 0 | Status: DISCONTINUED | OUTPATIENT
Start: 2021-07-26 | End: 2021-07-27

## 2021-07-26 RX ORDER — HYDROCORTISONE 1 %
1 OINTMENT (GRAM) TOPICAL EVERY 8 HOURS
Refills: 0 | Status: DISCONTINUED | OUTPATIENT
Start: 2021-07-26 | End: 2021-07-27

## 2021-07-26 RX ADMIN — HYDROMORPHONE HYDROCHLORIDE 0.25 MILLIGRAM(S): 2 INJECTION INTRAMUSCULAR; INTRAVENOUS; SUBCUTANEOUS at 03:57

## 2021-07-26 RX ADMIN — Medication 650 MILLIGRAM(S): at 23:12

## 2021-07-26 RX ADMIN — HYDROMORPHONE HYDROCHLORIDE 0.25 MILLIGRAM(S): 2 INJECTION INTRAMUSCULAR; INTRAVENOUS; SUBCUTANEOUS at 18:56

## 2021-07-26 RX ADMIN — OXYCODONE HYDROCHLORIDE 5 MILLIGRAM(S): 5 TABLET ORAL at 09:30

## 2021-07-26 RX ADMIN — OXYCODONE HYDROCHLORIDE 5 MILLIGRAM(S): 5 TABLET ORAL at 16:40

## 2021-07-26 RX ADMIN — Medication 25 MILLIGRAM(S): at 19:32

## 2021-07-26 RX ADMIN — Medication 650 MILLIGRAM(S): at 11:32

## 2021-07-26 RX ADMIN — LIDOCAINE 1 PATCH: 4 CREAM TOPICAL at 19:32

## 2021-07-26 RX ADMIN — ENOXAPARIN SODIUM 40 MILLIGRAM(S): 100 INJECTION SUBCUTANEOUS at 08:38

## 2021-07-26 RX ADMIN — SODIUM CHLORIDE 100 MILLILITER(S): 9 INJECTION, SOLUTION INTRAVENOUS at 07:18

## 2021-07-26 RX ADMIN — Medication 25 GRAM(S): at 03:42

## 2021-07-26 RX ADMIN — HYDROMORPHONE HYDROCHLORIDE 0.25 MILLIGRAM(S): 2 INJECTION INTRAMUSCULAR; INTRAVENOUS; SUBCUTANEOUS at 13:25

## 2021-07-26 RX ADMIN — OXYCODONE HYDROCHLORIDE 5 MILLIGRAM(S): 5 TABLET ORAL at 23:13

## 2021-07-26 RX ADMIN — OXYCODONE HYDROCHLORIDE 5 MILLIGRAM(S): 5 TABLET ORAL at 15:00

## 2021-07-26 RX ADMIN — Medication 650 MILLIGRAM(S): at 12:00

## 2021-07-26 RX ADMIN — LIDOCAINE 1 PATCH: 4 CREAM TOPICAL at 11:31

## 2021-07-26 RX ADMIN — HYDROMORPHONE HYDROCHLORIDE 0.25 MILLIGRAM(S): 2 INJECTION INTRAMUSCULAR; INTRAVENOUS; SUBCUTANEOUS at 13:10

## 2021-07-26 RX ADMIN — Medication 650 MILLIGRAM(S): at 06:04

## 2021-07-26 RX ADMIN — OXYCODONE HYDROCHLORIDE 5 MILLIGRAM(S): 5 TABLET ORAL at 09:00

## 2021-07-26 RX ADMIN — SODIUM CHLORIDE 666.67 MILLILITER(S): 9 INJECTION, SOLUTION INTRAVENOUS at 21:15

## 2021-07-26 RX ADMIN — HYDROMORPHONE HYDROCHLORIDE 0.25 MILLIGRAM(S): 2 INJECTION INTRAMUSCULAR; INTRAVENOUS; SUBCUTANEOUS at 07:00

## 2021-07-26 RX ADMIN — HYDROMORPHONE HYDROCHLORIDE 0.25 MILLIGRAM(S): 2 INJECTION INTRAMUSCULAR; INTRAVENOUS; SUBCUTANEOUS at 19:01

## 2021-07-26 RX ADMIN — OXYCODONE HYDROCHLORIDE 5 MILLIGRAM(S): 5 TABLET ORAL at 01:52

## 2021-07-26 RX ADMIN — SODIUM CHLORIDE 2000 MILLILITER(S): 9 INJECTION, SOLUTION INTRAVENOUS at 01:19

## 2021-07-26 RX ADMIN — HYDROMORPHONE HYDROCHLORIDE 0.25 MILLIGRAM(S): 2 INJECTION INTRAMUSCULAR; INTRAVENOUS; SUBCUTANEOUS at 07:02

## 2021-07-26 RX ADMIN — PANTOPRAZOLE SODIUM 40 MILLIGRAM(S): 20 TABLET, DELAYED RELEASE ORAL at 06:05

## 2021-07-26 RX ADMIN — OXYCODONE HYDROCHLORIDE 5 MILLIGRAM(S): 5 TABLET ORAL at 07:18

## 2021-07-26 RX ADMIN — Medication 1000 MILLIGRAM(S): at 00:15

## 2021-07-26 RX ADMIN — Medication 1 APPLICATION(S): at 14:14

## 2021-07-26 RX ADMIN — SODIUM CHLORIDE 3000 MILLILITER(S): 9 INJECTION, SOLUTION INTRAVENOUS at 00:32

## 2021-07-26 RX ADMIN — Medication 650 MILLIGRAM(S): at 06:05

## 2021-07-26 RX ADMIN — LIDOCAINE 1 PATCH: 4 CREAM TOPICAL at 23:10

## 2021-07-26 RX ADMIN — Medication 650 MILLIGRAM(S): at 18:55

## 2021-07-26 RX ADMIN — SODIUM CHLORIDE 100 MILLILITER(S): 9 INJECTION, SOLUTION INTRAVENOUS at 00:07

## 2021-07-26 RX ADMIN — SENNA PLUS 2 TABLET(S): 8.6 TABLET ORAL at 21:05

## 2021-07-26 RX ADMIN — Medication 650 MILLIGRAM(S): at 18:17

## 2021-07-26 RX ADMIN — HYDROMORPHONE HYDROCHLORIDE 0.25 MILLIGRAM(S): 2 INJECTION INTRAMUSCULAR; INTRAVENOUS; SUBCUTANEOUS at 03:35

## 2021-07-26 RX ADMIN — Medication 1 APPLICATION(S): at 21:05
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
## 2021-07-26 NOTE — PATIENT PROFILE ADULT - VISION (WITH CORRECTIVE LENSES IF THE PATIENT USUALLY WEARS THEM):
Wears reading glasses./Normal vision: sees adequately in most situations; can see medication labels, newsprint

## 2021-07-27 ENCOUNTER — TRANSCRIPTION ENCOUNTER (OUTPATIENT)
Age: 66
End: 2021-07-27

## 2021-07-27 VITALS — OXYGEN SATURATION: 97 %

## 2021-07-27 LAB
ANION GAP SERPL CALC-SCNC: 10 MMOL/L — SIGNIFICANT CHANGE UP (ref 7–14)
BUN SERPL-MCNC: 16 MG/DL — SIGNIFICANT CHANGE UP (ref 10–20)
CALCIUM SERPL-MCNC: 8.2 MG/DL — LOW (ref 8.5–10.1)
CHLORIDE SERPL-SCNC: 100 MMOL/L — SIGNIFICANT CHANGE UP (ref 98–110)
CO2 SERPL-SCNC: 23 MMOL/L — SIGNIFICANT CHANGE UP (ref 17–32)
CREAT SERPL-MCNC: 1.3 MG/DL — SIGNIFICANT CHANGE UP (ref 0.7–1.5)
GLUCOSE BLDC GLUCOMTR-MCNC: 116 MG/DL — HIGH (ref 70–99)
GLUCOSE SERPL-MCNC: 133 MG/DL — HIGH (ref 70–99)
HCT VFR BLD CALC: 31.6 % — LOW (ref 42–52)
HGB BLD-MCNC: 10.5 G/DL — LOW (ref 14–18)
MAGNESIUM SERPL-MCNC: 1.8 MG/DL — SIGNIFICANT CHANGE UP (ref 1.8–2.4)
MCHC RBC-ENTMCNC: 30.8 PG — SIGNIFICANT CHANGE UP (ref 27–31)
MCHC RBC-ENTMCNC: 33.2 G/DL — SIGNIFICANT CHANGE UP (ref 32–37)
MCV RBC AUTO: 92.7 FL — SIGNIFICANT CHANGE UP (ref 80–94)
NRBC # BLD: 0 /100 WBCS — SIGNIFICANT CHANGE UP (ref 0–0)
PHOSPHATE SERPL-MCNC: 3 MG/DL — SIGNIFICANT CHANGE UP (ref 2.1–4.9)
PLATELET # BLD AUTO: 224 K/UL — SIGNIFICANT CHANGE UP (ref 130–400)
POTASSIUM SERPL-MCNC: 4.7 MMOL/L — SIGNIFICANT CHANGE UP (ref 3.5–5)
POTASSIUM SERPL-SCNC: 4.7 MMOL/L — SIGNIFICANT CHANGE UP (ref 3.5–5)
RBC # BLD: 3.41 M/UL — LOW (ref 4.7–6.1)
RBC # FLD: 13.2 % — SIGNIFICANT CHANGE UP (ref 11.5–14.5)
SODIUM SERPL-SCNC: 133 MMOL/L — LOW (ref 135–146)
WBC # BLD: 6.07 K/UL — SIGNIFICANT CHANGE UP (ref 4.8–10.8)
WBC # FLD AUTO: 6.07 K/UL — SIGNIFICANT CHANGE UP (ref 4.8–10.8)

## 2021-07-27 PROCEDURE — 71045 X-RAY EXAM CHEST 1 VIEW: CPT | Mod: 26

## 2021-07-27 RX ORDER — SENNA PLUS 8.6 MG/1
2 TABLET ORAL
Qty: 0 | Refills: 0 | DISCHARGE
Start: 2021-07-27

## 2021-07-27 RX ORDER — ALPRAZOLAM 0.25 MG
0.25 TABLET ORAL ONCE
Refills: 0 | Status: DISCONTINUED | OUTPATIENT
Start: 2021-07-27 | End: 2021-07-27

## 2021-07-27 RX ORDER — LIDOCAINE 4 G/100G
1 CREAM TOPICAL
Qty: 5 | Refills: 0
Start: 2021-07-27

## 2021-07-27 RX ORDER — ACETAMINOPHEN 500 MG
2 TABLET ORAL
Qty: 0 | Refills: 0 | DISCHARGE
Start: 2021-07-27

## 2021-07-27 RX ORDER — MAGNESIUM SULFATE 500 MG/ML
2 VIAL (ML) INJECTION ONCE
Refills: 0 | Status: COMPLETED | OUTPATIENT
Start: 2021-07-27 | End: 2021-07-27

## 2021-07-27 RX ORDER — OXYCODONE HYDROCHLORIDE 5 MG/1
1 TABLET ORAL
Qty: 10 | Refills: 0
Start: 2021-07-27

## 2021-07-27 RX ORDER — DIPHENHYDRAMINE HCL 50 MG
1 CAPSULE ORAL
Qty: 0 | Refills: 0 | DISCHARGE
Start: 2021-07-27

## 2021-07-27 RX ADMIN — PANTOPRAZOLE SODIUM 40 MILLIGRAM(S): 20 TABLET, DELAYED RELEASE ORAL at 05:05

## 2021-07-27 RX ADMIN — OXYCODONE HYDROCHLORIDE 5 MILLIGRAM(S): 5 TABLET ORAL at 05:08

## 2021-07-27 RX ADMIN — OXYCODONE HYDROCHLORIDE 5 MILLIGRAM(S): 5 TABLET ORAL at 11:22

## 2021-07-27 RX ADMIN — OXYCODONE HYDROCHLORIDE 5 MILLIGRAM(S): 5 TABLET ORAL at 12:08

## 2021-07-27 RX ADMIN — Medication 650 MILLIGRAM(S): at 06:06

## 2021-07-27 RX ADMIN — LIDOCAINE 1 PATCH: 4 CREAM TOPICAL at 11:21

## 2021-07-27 RX ADMIN — Medication 25 GRAM(S): at 03:04

## 2021-07-27 RX ADMIN — Medication 25 MILLIGRAM(S): at 02:03

## 2021-07-27 RX ADMIN — Medication 1 APPLICATION(S): at 05:06

## 2021-07-27 RX ADMIN — Medication 650 MILLIGRAM(S): at 00:21

## 2021-07-27 RX ADMIN — Medication 650 MILLIGRAM(S): at 05:05

## 2021-07-27 RX ADMIN — Medication 650 MILLIGRAM(S): at 12:08

## 2021-07-27 RX ADMIN — ENOXAPARIN SODIUM 40 MILLIGRAM(S): 100 INJECTION SUBCUTANEOUS at 11:22

## 2021-07-27 RX ADMIN — Medication 0.25 MILLIGRAM(S): at 05:15

## 2021-07-27 RX ADMIN — OXYCODONE HYDROCHLORIDE 5 MILLIGRAM(S): 5 TABLET ORAL at 00:21

## 2021-07-27 RX ADMIN — Medication 650 MILLIGRAM(S): at 11:21

## 2021-07-27 RX ADMIN — OXYCODONE HYDROCHLORIDE 5 MILLIGRAM(S): 5 TABLET ORAL at 06:06

## 2021-07-27 RX ADMIN — Medication 25 MILLIGRAM(S): at 11:22
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
## 2021-07-27 NOTE — DISCHARGE NOTE PROVIDER - CARE PROVIDERS DIRECT ADDRESSES
[email or web address]

## 2021-07-27 NOTE — DISCHARGE NOTE PROVIDER - NSDCMRMEDTOKEN_GEN_ALL_CORE_FT
acetaminophen 325 mg oral tablet: 2 tab(s) orally every 6 hours  Lasix 20 mg oral tablet: 0.5 tab(s) orally once a day  lidocaine 4% topical film: Apply topically to affected area once a day, leave on for 12 hours, remove for 12 hours   lisinopril 10 mg oral tablet: 1 tab(s) orally once a day  oxyCODONE 5 mg oral tablet: 1 tab(s) orally every 6 hours, As needed, Severe Pain (7 - 10) MDD:4  senna oral tablet: 2 tab(s) orally once a day (at bedtime)  tadalafil 5 mg oral tablet: 1 tab(s) orally once a day

## 2021-07-27 NOTE — DISCHARGE NOTE PROVIDER - CARE PROVIDER_API CALL
Esequiel Crenshaw)  Surgery; Surgical Critical Care  [address] [place] [address], [address] [place] [address]  Phone: [phone]  Fax: [phone]  Follow Up Time: 1 week

## 2021-07-27 NOTE — PHYSICAL THERAPY INITIAL EVALUATION ADULT - GENERAL OBSERVATIONS, REHAB EVAL
10:00-10:30. chart reviewed. Pt received sitting at B/S chair, alert, oriented, able to follow multi-step instructions and agreeable to PT evaluation.  + IV, + monitoring, On RA, VSS, CC sternal pain with coughing. NAD.
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
## 2021-07-27 NOTE — PHYSICAL THERAPY INITIAL EVALUATION ADULT - GAIT TRAINING, PT EVAL
no Pt will ambulate using RW or least restrictive AD for 300 ft with MI by discharge to facilitate return to PLOF., negotiate 12 steps using 1 HR under supervision
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
## 2021-07-27 NOTE — DISCHARGE NOTE NURSING/CASE MANAGEMENT/SOCIAL WORK - PATIENT PORTAL LINK FT
You can access the FollowMyHealth Patient Portal offered by [facility] by registering at the following website: [email or web address]. By joining [employer]’s FollowMyHealth portal, you will also be able to view your health information using other applications (apps) compatible with our system.
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
## 2021-07-29 DIAGNOSIS — E86.0 DEHYDRATION: ICD-10-CM

## 2021-07-29 DIAGNOSIS — I95.1 ORTHOSTATIC HYPOTENSION: ICD-10-CM

## 2021-07-29 DIAGNOSIS — N17.9 ACUTE KIDNEY FAILURE, UNSPECIFIED: ICD-10-CM

## 2021-07-29 DIAGNOSIS — I10 ESSENTIAL (PRIMARY) HYPERTENSION: ICD-10-CM

## 2021-07-29 DIAGNOSIS — R56.9 UNSPECIFIED CONVULSIONS: ICD-10-CM

## 2021-07-29 DIAGNOSIS — D32.0 BENIGN NEOPLASM OF CEREBRAL MENINGES: ICD-10-CM

## 2021-07-29 DIAGNOSIS — Z85.46 PERSONAL HISTORY OF MALIGNANT NEOPLASM OF PROSTATE: ICD-10-CM

## 2021-07-30 DIAGNOSIS — Z91.038 OTHER INSECT ALLERGY STATUS: ICD-10-CM

## 2021-07-30 DIAGNOSIS — I10 ESSENTIAL (PRIMARY) HYPERTENSION: ICD-10-CM

## 2021-07-30 DIAGNOSIS — I48.91 UNSPECIFIED ATRIAL FIBRILLATION: ICD-10-CM

## 2021-07-30 DIAGNOSIS — Z85.46 PERSONAL HISTORY OF MALIGNANT NEOPLASM OF PROSTATE: ICD-10-CM

## 2021-07-30 DIAGNOSIS — S22.20XA UNSPECIFIED FRACTURE OF STERNUM, INITIAL ENCOUNTER FOR CLOSED FRACTURE: ICD-10-CM

## 2021-07-30 DIAGNOSIS — S22.41XA MULTIPLE FRACTURES OF RIBS, RIGHT SIDE, INITIAL ENCOUNTER FOR CLOSED FRACTURE: ICD-10-CM
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
## 2021-07-30 DIAGNOSIS — N52.9 MALE ERECTILE DYSFUNCTION, UNSPECIFIED: ICD-10-CM

## 2021-07-30 DIAGNOSIS — S27.892A CONTUSION OF OTHER SPECIFIED INTRATHORACIC ORGANS, INITIAL ENCOUNTER: ICD-10-CM
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
## 2021-07-30 DIAGNOSIS — S27.321A CONTUSION OF LUNG, UNILATERAL, INITIAL ENCOUNTER: ICD-10-CM

## 2021-07-30 DIAGNOSIS — E16.2 HYPOGLYCEMIA, UNSPECIFIED: ICD-10-CM

## 2021-07-30 DIAGNOSIS — I71.2 THORACIC AORTIC ANEURYSM, WITHOUT RUPTURE: ICD-10-CM

## 2021-07-30 DIAGNOSIS — R55 SYNCOPE AND COLLAPSE: ICD-10-CM

## 2021-08-06 ENCOUNTER — APPOINTMENT (OUTPATIENT)
Dept: SURGERY | Facility: CLINIC | Age: 66
End: 2021-08-06
Payer: COMMERCIAL

## 2021-08-06 VITALS
HEART RATE: 87 BPM | WEIGHT: 199 LBS | DIASTOLIC BLOOD PRESSURE: 72 MMHG | TEMPERATURE: 97.7 F | BODY MASS INDEX: 30.16 KG/M2 | HEIGHT: 68 IN | SYSTOLIC BLOOD PRESSURE: 120 MMHG
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
## 2021-08-06 DIAGNOSIS — S22.41XS MULTIPLE FRACTURES OF RIBS, RIGHT SIDE, SEQUELA: ICD-10-CM

## 2021-08-06 DIAGNOSIS — S22.20XD UNSPECIFIED FRACTURE OF STERNUM, SUBSEQUENT ENCOUNTER FOR FRACTURE WITH ROUTINE HEALING: ICD-10-CM

## 2021-08-06 DIAGNOSIS — S22.42XK MULTIPLE FRACTURES OF RIBS, LEFT SIDE, SUBSEQUENT ENCOUNTER FOR FRACTURE WITH NONUNION: ICD-10-CM

## 2021-08-06 PROCEDURE — 99072 ADDL SUPL MATRL&STAF TM PHE: CPT

## 2021-08-06 PROCEDURE — 99213 OFFICE O/P EST LOW 20 MIN: CPT

## 2021-09-07 ENCOUNTER — APPOINTMENT (OUTPATIENT)
Dept: CARDIOLOGY | Facility: CLINIC | Age: 66
End: 2021-09-07
Payer: MEDICARE

## 2021-09-07 VITALS
HEIGHT: 68 IN | HEART RATE: 72 BPM | TEMPERATURE: 98 F | WEIGHT: 190 LBS | SYSTOLIC BLOOD PRESSURE: 115 MMHG | BODY MASS INDEX: 28.79 KG/M2 | DIASTOLIC BLOOD PRESSURE: 75 MMHG

## 2021-09-07 DIAGNOSIS — Z80.0 FAMILY HISTORY OF MALIGNANT NEOPLASM OF DIGESTIVE ORGANS: ICD-10-CM

## 2021-09-07 DIAGNOSIS — Z80.1 FAMILY HISTORY OF MALIGNANT NEOPLASM OF TRACHEA, BRONCHUS AND LUNG: ICD-10-CM

## 2021-09-07 PROCEDURE — 99205 OFFICE O/P NEW HI 60 MIN: CPT

## 2021-09-07 PROCEDURE — 93000 ELECTROCARDIOGRAM COMPLETE: CPT

## 2021-09-07 NOTE — CARDIOLOGY SUMMARY
[de-identified] : 9/7/21 NSR (HR 72 bpm), PACs\par 7/25/21 AFib [de-identified] : Aug 2nd (two weeks) [de-identified] : recent nuclear stress test (neg per pt) [de-identified] : 7/26/21 \par  1. Left ventricular ejection fraction, by visual estimation, is >75%.\par  2. Hyperdynamic global left ventricular systolic function.\par  3. Mild dilatation of the ascending aorta (4.2 cm).\par Normal LA size

## 2021-09-07 NOTE — ASSESSMENT
[FreeTextEntry1] : # Paroxysmal AFib\par - Patient denies any symptoms while in AF. Normal EF. Mild LAE on one of his echocardiograms. \par - Will obtain 2 week monitor results to determine burden\par - Patient would like to defer ablation until after the holidays to avoid AAD given young age.  He also has dental implants coming up and wants to finish that first. \par - Cont Eliquis for CHADS VASc of at least 2 (HTN, Age > 65). Patient denies signs/symptoms of bleeding. \par - Pulm referral for sleep apnea eval. \par \par # HTN\par - BP well controlled. Cont Lisinopril. \par - 2g Na diet enforced\par \par I have also advised the patient to go to the nearest emergency room if he experiences any chest pain, dyspnea, syncope, or has any other compelling symptoms.

## 2021-09-07 NOTE — DISCUSSION/SUMMARY
[FreeTextEntry1] : We had an extensive conversation regarding the nature of atrial fibrillation, including potential etiologies, underlying pathophysiology and natural history of the disease. In addition, the potential risk of thromboembolic events and assessment of that risk were discussed. I have emphasized the importance of continuing anticoagulation. \par \par In addition, the maintenance of sinus rhythm along with adjuvant antiarrhythmic agents and catheter ablation therapy were discussed. The rationale for and risks of ablation therapy were discussed, including but not limited to bleeding, vascular injury, groin complications, cardiac perforation and tamponade, stroke, esophageal injury, pulmonary vein stenosis, need for pacemaker, need for cardiac surgery, and death. In addition, the long-term and ongoing nature of this therapy were also discussed, including the critical role of continued monitoring post-ablation and the potential for the necessity of repeat ablation procedures to definitively treat the condition. \par \par The patient verbalized understanding of the discussion and all questions were addressed and answered. The patient would like to defer ablation until after the holidays.
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
## 2021-11-16 ENCOUNTER — APPOINTMENT (OUTPATIENT)
Dept: CARDIOLOGY | Facility: CLINIC | Age: 66
End: 2021-11-16
Payer: MEDICARE

## 2021-11-16 VITALS
BODY MASS INDEX: 28.79 KG/M2 | DIASTOLIC BLOOD PRESSURE: 85 MMHG | HEIGHT: 68 IN | SYSTOLIC BLOOD PRESSURE: 140 MMHG | WEIGHT: 190 LBS | HEART RATE: 68 BPM

## 2021-11-16 VITALS — DIASTOLIC BLOOD PRESSURE: 78 MMHG | SYSTOLIC BLOOD PRESSURE: 124 MMHG

## 2021-11-16 DIAGNOSIS — I10 ESSENTIAL (PRIMARY) HYPERTENSION: ICD-10-CM

## 2021-11-16 DIAGNOSIS — D64.9 ANEMIA, UNSPECIFIED: ICD-10-CM

## 2021-11-16 PROCEDURE — 99214 OFFICE O/P EST MOD 30 MIN: CPT

## 2021-11-16 PROCEDURE — 93000 ELECTROCARDIOGRAM COMPLETE: CPT

## 2021-11-16 NOTE — ASSESSMENT
[FreeTextEntry1] : \par # Paroxysmal AFib on Eliquis\par - Patient denies any symptoms while in AF. Normal EF. LAE on echo. \par - Only 1 episode lasting an hour on 2 week monitor from Aug\par - Pt is awaiting more dental work and urologic procedure. Rec ablation AFTER completing his procedures. \par - Cont Eliquis for CHADS VASc of at least 2 (HTN, Age > 65). Patient denies signs/symptoms of bleeding. Labs from Aug reviewed and show Hg 10.9 Pt has cologuard at home. Advised pt to complete test to assess for GIB.\par - Pulm referral for sleep apnea eval provided but pt has not yet followed up as he has been busy with dental and urology work. \par \par # HTN\par - BP improved on repeat. \par - Cont Lisinopril. \par - 2g Na diet enforced\par \par I have also advised the patient to go to the nearest emergency room if he experiences any chest pain, dyspnea, syncope, or has any other compelling symptoms. \par \par Follow up in 4-6 mo or sooner if he has recurrence of AFib

## 2021-11-16 NOTE — CARDIOLOGY SUMMARY
[de-identified] : 11/16/21 NSR (HR 68 bpm)\par 9/7/21 NSR (HR 72 bpm), PACs\par 7/25/21 AFib [de-identified] : 8/4-8/18/21 1 episode of AF on 8/15/21 lasting 1 hr 10 min with HR as fast as 104 bpm.  [de-identified] : Recent nuclear stress test (neg per pt) [de-identified] : 8/23/21 EF normal. Mod LAE. Mild cLVH. Structurally normal heart valves without stenosis or regurgitation.\par \par 7/26/21 \par  1. Left ventricular ejection fraction, by visual estimation, is >75%.\par  2. Hyperdynamic global left ventricular systolic function.\par  3. Mild dilatation of the ascending aorta (4.2 cm).\par Normal LA size

## 2021-11-16 NOTE — PHYSICAL EXAM
[Well Developed] : well developed [Well Nourished] : well nourished [No Acute Distress] : no acute distress [Normal Conjunctiva] : normal conjunctiva [Normal Venous Pressure] : normal venous pressure [Normal S1, S2] : normal S1, S2 [No Murmur] : no murmur [Clear Lung Fields] : clear lung fields [Good Air Entry] : good air entry [No Respiratory Distress] : no respiratory distress  [Soft] : abdomen soft [Normal Gait] : normal gait [No Edema] : no edema [No Cyanosis] : no cyanosis [No Rash] : no rash [No Skin Lesions] : no skin lesions [Moves all extremities] : moves all extremities [Normal Speech] : normal speech [Alert and Oriented] : alert and oriented [Normal memory] : normal memory
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
## 2022-03-09 ENCOUNTER — EMERGENCY (EMERGENCY)
Facility: HOSPITAL | Age: 67
LOS: 0 days | Discharge: HOME | End: 2022-03-09
Attending: EMERGENCY MEDICINE | Admitting: EMERGENCY MEDICINE
Payer: MEDICARE

## 2022-03-09 VITALS
DIASTOLIC BLOOD PRESSURE: 52 MMHG | SYSTOLIC BLOOD PRESSURE: 110 MMHG | OXYGEN SATURATION: 94 % | RESPIRATION RATE: 19 BRPM | TEMPERATURE: 97 F | HEIGHT: 68 IN | HEART RATE: 78 BPM | WEIGHT: 179.9 LBS
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
## 2022-03-09 DIAGNOSIS — I10 ESSENTIAL (PRIMARY) HYPERTENSION: ICD-10-CM

## 2022-03-09 DIAGNOSIS — R21 RASH AND OTHER NONSPECIFIC SKIN ERUPTION: ICD-10-CM

## 2022-03-09 DIAGNOSIS — Z98.890 OTHER SPECIFIED POSTPROCEDURAL STATES: Chronic | ICD-10-CM

## 2022-03-09 DIAGNOSIS — T78.40XA ALLERGY, UNSPECIFIED, INITIAL ENCOUNTER: ICD-10-CM

## 2022-03-09 PROCEDURE — 99284 EMERGENCY DEPT VISIT MOD MDM: CPT

## 2022-03-09 RX ORDER — DIPHENHYDRAMINE HCL 50 MG
50 CAPSULE ORAL ONCE
Refills: 0 | Status: COMPLETED | OUTPATIENT
Start: 2022-03-09 | End: 2022-03-09

## 2022-03-09 RX ORDER — FAMOTIDINE 10 MG/ML
20 INJECTION INTRAVENOUS ONCE
Refills: 0 | Status: COMPLETED | OUTPATIENT
Start: 2022-03-09 | End: 2022-03-09

## 2022-03-09 RX ORDER — FAMOTIDINE 10 MG/ML
20 INJECTION INTRAVENOUS DAILY
Refills: 0 | Status: DISCONTINUED | OUTPATIENT
Start: 2022-03-09 | End: 2022-03-09

## 2022-03-09 RX ORDER — DIPHENHYDRAMINE HCL 50 MG
50 CAPSULE ORAL ONCE
Refills: 0 | Status: DISCONTINUED | OUTPATIENT
Start: 2022-03-09 | End: 2022-03-09

## 2022-03-09 RX ORDER — SODIUM CHLORIDE 9 MG/ML
1000 INJECTION INTRAMUSCULAR; INTRAVENOUS; SUBCUTANEOUS ONCE
Refills: 0 | Status: COMPLETED | OUTPATIENT
Start: 2022-03-09 | End: 2022-03-09

## 2022-03-09 RX ADMIN — Medication 125 MILLIGRAM(S): at 17:14

## 2022-03-09 RX ADMIN — Medication 50 MILLIGRAM(S): at 17:50

## 2022-03-09 RX ADMIN — FAMOTIDINE 104 MILLIGRAM(S): 10 INJECTION INTRAVENOUS at 17:43

## 2022-03-09 RX ADMIN — SODIUM CHLORIDE 1000 MILLILITER(S): 9 INJECTION INTRAMUSCULAR; INTRAVENOUS; SUBCUTANEOUS at 17:14

## 2022-03-09 NOTE — ED PROVIDER NOTE - OBJECTIVE STATEMENT
this is a 65 yo male presents to ed for evaluation . patient thinks she got bit by bug yesterday. patient states that since last night he got hives and was very itchy. patient states he took 25 mg of po and he states he is still itchy.

## 2022-03-09 NOTE — ED PROVIDER NOTE - ATTENDING CONTRIBUTION TO CARE
67 yo M PMHx noted presents with hives/rash since yesterday. + itching,  Pt took 1 tab OTC Benadryl with little improvement.  Pt states that symptoms started yesterday after he thinks a bug bit him on the right arm, no SOB, no n/v. Pt states he has h/o allergic reaction in the past,  On exam pt in nad AAO x 3, speaking full clear sentences, Op clear, no lip or tongue swelling, Op clear, Lungs cta b/l, + diffuse hives to trunk and ext, no edema, + redness to face

## 2022-03-09 NOTE — ED PROVIDER NOTE - CARE PROVIDER_API CALL
your, pmd  Phone: (   )    -  Fax: (   )    -  Follow Up Time:     allergist,   Phone: (   )    -  Fax: (   )    -  Follow Up Time:

## 2022-03-09 NOTE — ED PROVIDER NOTE - PATIENT PORTAL LINK FT
You can access the FollowMyHealth Patient Portal offered by [facility] by registering at the following website: [email or web address]. By joining [employer]’s FollowMyHealth portal, you will also be able to view your health information using other applications (apps) compatible with our system.

## 2022-03-09 NOTE — ED PROVIDER NOTE - PHYSICAL EXAMINATION
--EXAM--  VITAL SIGNS: I have reviewed vs documented at present.  CONSTITUTIONAL: Well-developed; well-nourished; in no acute distress.   SKIN: hives generalized   HEAD: Normocephalic; atraumatic.  EYES: PERRL, EOM intact; conjunctiva and sclera clear. No nystagmus.  ENT: No nasal discharge; airway clear.  NECK: Supple; non tender.  CARD: S1, S2, Regular rate and rhythm.   RESP: No wheezes, rales or rhonchi.

## 2022-03-09 NOTE — ED PROVIDER NOTE - PROVIDER TOKENS
FREE:[LAST:[your],FIRST:[pmd],PHONE:[(   )    -],FAX:[(   )    -]],FREE:[LAST:[allergist],PHONE:[(   )    -],FAX:[(   )    -]]

## 2022-03-09 NOTE — ED PROVIDER NOTE - CLINICAL SUMMARY MEDICAL DECISION MAKING FREE TEXT BOX
Pt treated with anti-histamines and steroid with improvement in symptoms.  Hives improving,  Pt instructed ti cint Benadryl 50 mg around the clock for the next 2 days, Will start po steroid burst, rec adding OTC Pepcid as well. Strict return precautions discussed. Pt instructed to f/u PMD, allergist. Pt instructed to return if any worsening symptoms or concerns.  They verbalize understanding.

## 2022-03-09 NOTE — ED ADULT NURSE NOTE - NSIMPLEMENTINTERV_GEN_ALL_ED
Implemented All Universal Safety Interventions:  [place] to call system. Call bell, personal items and telephone within reach. Instruct patient to call for assistance. Room bathroom lighting operational. Non-slip footwear when patient is off stretcher. Physically safe environment: no spills, clutter or unnecessary equipment. Stretcher in lowest position, wheels locked, appropriate side rails in place.

## 2022-05-31 NOTE — ED ADULT NURSE NOTE - SUICIDE SCREENING QUESTION 2
Chief Complaint:    Chief Complaint   Patient presents with     STD     Wanting gc testing done. No symptoms. New partner.       ASSESSMENT     1. Screen for STD (sexually transmitted disease)        PLAN    We will call with results when available.  Follow up with PCP if symptoms develop.  Patient verbalized understanding and agreed with this plan.    Labs:     No results found for any visits on 05/31/22.    Problem history    Patient Active Problem List   Diagnosis     Vitamin D deficiency     Moderate recurrent major depression (H)     Anxiety     Panic attack     Contraception     Panic disorder without agoraphobia     Episodic mood disorder (H)     Bipolar 2 disorder (H)     PTSD (post-traumatic stress disorder)     Intractable migraine without aura and with status migrainosus     Myofascial pain     Temporomandibular joint disorder     Tension-type headache     Xerostomia     Elevated blood-pressure reading without diagnosis of hypertension     Chronic bilateral low back pain with bilateral sciatica       Current Meds    Current Outpatient Medications:      amphetamine-dextroamphetamine (ADDERALL XR) 20 MG 24 hr capsule, TAKE 2 CAPSULES BY MOUTH DAILY, Disp: , Rfl:      DULoxetine (CYMBALTA) 30 MG capsule, TAKE 1 CAPSULE BY MOUTH ONCE DAILY IN ADDITION TO THE 60MG CAPSULE AT BEDTIME FOR A TOTAL DOSE OF 90MG DAILY., Disp: , Rfl: 2     DULoxetine (CYMBALTA) 60 MG capsule, TAKE 1 CAPSULE BY MOUTH ONCE DAILY, Disp: , Rfl: 3     LamoTRIgine (LAMICTAL) 250 MG ER tablet, Take 250 mg by mouth At Bedtime, Disp: , Rfl:     Current Facility-Administered Medications:      [START ON 6/10/2022] medroxyPROGESTERone (DEPO-PROVERA) injection 150 mg, 150 mg, Intramuscular, Q90 Days, Josie Angeles APRN CNP    Allergies  Allergies   Allergen Reactions     Amoxicillin      bubblegum flavor makes her ill     Augmentin Hives     Other reaction(s): Vomiting       SUBJECTIVE    HPI:  Alfreda Hernandez is a 20 year old female  here for STD screening.  She has a new partner and would like to be checked for gonorrhea and chlamydia.  She is asymptomatic and is not aware of any exposure.      ROS:      Review of Systems   Constitutional: Negative.  Negative for activity change, chills, fatigue and fever.   HENT: Negative for congestion, ear pain, rhinorrhea and sore throat.    Respiratory: Negative.  Negative for cough and shortness of breath.    Cardiovascular: Negative.  Negative for chest pain and palpitations.   Gastrointestinal: Negative.  Negative for abdominal pain, diarrhea, nausea and vomiting.   Endocrine: Negative.  Negative for polydipsia and polyuria.   Genitourinary: Negative for dysuria, flank pain, frequency, hematuria, pelvic pain, urgency, vaginal discharge and vaginal pain.   Musculoskeletal: Negative.  Negative for myalgias, neck pain and neck stiffness.   Allergic/Immunologic: Negative for immunocompromised state.   Neurological: Negative.  Negative for dizziness, weakness, light-headedness and headaches.   Hematological: Negative for adenopathy.       Family History   Family History   Problem Relation Age of Onset     Asthma Mother      Thyroid Disease Mother         resolved shortly after pregnancy     Other - See Comments Mother         Dx with Lupus on 11/2014     Hypertension Father      Diabetes Maternal Grandfather      Heart Disease Other         maternal great grandparents     Breast Cancer Other         maternal great aunt     Cerebrovascular Disease No family hx of      Glaucoma No family hx of      Macular Degeneration No family hx of         Social History  Social History     Socioeconomic History     Marital status: Single     Spouse name: Not on file     Number of children: Not on file     Years of education: Not on file     Highest education level: Not on file   Occupational History     Not on file   Tobacco Use     Smoking status: Never Smoker     Smokeless tobacco: Never Used     Tobacco comment: non  smoking household   Substance and Sexual Activity     Alcohol use: No     Alcohol/week: 0.0 standard drinks     Drug use: No     Sexual activity: Yes     Partners: Male     Birth control/protection: Injection   Other Topics Concern     Not on file   Social History Narrative     Not on file     Social Determinants of Health     Financial Resource Strain: Not on file   Food Insecurity: Not on file   Transportation Needs: Not on file   Physical Activity: Not on file   Stress: Not on file   Social Connections: Not on file   Intimate Partner Violence: Not on file   Housing Stability: Not on file           OBJECTIVE     Vital signs noted and reviewed by Doug Cuellar PA-C  /88   Pulse 99   Temp 98.1  F (36.7  C) (Tympanic)   Wt 63.1 kg (139 lb 3.2 oz)   SpO2 99%   BMI 22.81 kg/m       Physical Exam  Vitals and nursing note reviewed.   Constitutional:       General: She is not in acute distress.     Appearance: Normal appearance. She is well-developed. She is not ill-appearing, toxic-appearing or diaphoretic.   HENT:      Head: Normocephalic and atraumatic.      Right Ear: Tympanic membrane and external ear normal.      Left Ear: Tympanic membrane and external ear normal.   Eyes:      Pupils: Pupils are equal, round, and reactive to light.   Cardiovascular:      Rate and Rhythm: Normal rate and regular rhythm.      Heart sounds: Normal heart sounds. No murmur heard.    No friction rub. No gallop.   Pulmonary:      Effort: Pulmonary effort is normal. No respiratory distress.      Breath sounds: Normal breath sounds. No wheezing or rales.   Chest:      Chest wall: No tenderness.   Abdominal:      General: Bowel sounds are normal. There is no distension.      Palpations: Abdomen is soft. Abdomen is not rigid. There is no mass.      Tenderness: There is no abdominal tenderness. There is no guarding or rebound. Negative signs include Parks's sign and McBurney's sign.   Musculoskeletal:      Cervical back: Normal  range of motion and neck supple.   Lymphadenopathy:      Cervical: No cervical adenopathy.   Skin:     General: Skin is warm and dry.   Neurological:      Mental Status: She is alert and oriented to person, place, and time.      Cranial Nerves: No cranial nerve deficit.      Deep Tendon Reflexes: Reflexes are normal and symmetric.   Psychiatric:         Behavior: Behavior normal. Behavior is cooperative.         Thought Content: Thought content normal.         Judgment: Judgment normal.             Doug Cuellar PA-C  5/31/2022, 12:40 PM     No

## 2022-08-30 NOTE — ED ADULT NURSE NOTE - SUICIDE SCREENING DEPRESSION
Reminded patient of scheduled procedure on 8/31/22. Instructions given and COVID questionnaire completed.
Negative

## 2022-12-02 NOTE — ED PROVIDER NOTE - WORK/EXCUSE FORM DATE
Goal Outcome Evaluation:      Plan of Care Reviewed With: patient    ICU End of Shift Summary.  For vital signs and complete assessments, please see documentation flowsheets.      Pertinent assessments: CIWA 4-16, ativan given and effective. Hallucinations; Seroquel given. Continues on precedex drip. Denies pain. Intermittently follows commands. Afebrile. RA. Lungs clear. Lino in place for retention; adequate UOP. Mechanical soft diet. Total feed. Little intake. BS+.    Major Shift Events: -Attempted to wean off prece ex and utilize Seroquel and ativan. Patient became combative towards staff and continuously attempting to get out of bed. Unable to wean at this time.                -Spit put part of morning meds in AM. MDs aware.     Plan (Upcoming Events): Continue to wean off precedex as able. Increase nutritional intake.     Discharge/Transfer Needs: TBD     Bedside Shift Report Completed : yes  Bedside Safety Check Completed: yes     11-Mar-2022

## 2023-03-24 NOTE — ED ADULT TRIAGE NOTE - MEANS OF ARRIVAL
Pt reports R flank and back pain since last night that has not improved with tylenol or ibuprofen. She states it is worse with getting out of a chair or getting out of bed. No known injury, no heavy lifting.    ambulatory

## 2023-06-12 ENCOUNTER — EMERGENCY (EMERGENCY)
Facility: HOSPITAL | Age: 68
LOS: 0 days | Discharge: HOME | End: 2023-06-12
Attending: EMERGENCY MEDICINE
Payer: MEDICARE

## 2023-06-12 VITALS
RESPIRATION RATE: 18 BRPM | HEIGHT: 68 IN | TEMPERATURE: 98 F | HEART RATE: 84 BPM | SYSTOLIC BLOOD PRESSURE: 136 MMHG | OXYGEN SATURATION: 95 % | WEIGHT: 175.05 LBS | DIASTOLIC BLOOD PRESSURE: 68 MMHG

## 2023-06-12 DIAGNOSIS — L29.9 PRURITUS, UNSPECIFIED: ICD-10-CM

## 2023-06-12 DIAGNOSIS — R21 RASH AND OTHER NONSPECIFIC SKIN ERUPTION: ICD-10-CM
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
## 2023-06-12 DIAGNOSIS — Z98.890 OTHER SPECIFIED POSTPROCEDURAL STATES: Chronic | ICD-10-CM
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
## 2023-06-12 DIAGNOSIS — I10 ESSENTIAL (PRIMARY) HYPERTENSION: ICD-10-CM

## 2023-06-12 DIAGNOSIS — T78.40XA ALLERGY, UNSPECIFIED, INITIAL ENCOUNTER: ICD-10-CM

## 2023-06-12 DIAGNOSIS — Z98.890 OTHER SPECIFIED POSTPROCEDURAL STATES: ICD-10-CM

## 2023-06-12 DIAGNOSIS — Z85.46 PERSONAL HISTORY OF MALIGNANT NEOPLASM OF PROSTATE: ICD-10-CM

## 2023-06-12 PROCEDURE — 99284 EMERGENCY DEPT VISIT MOD MDM: CPT

## 2023-06-12 PROCEDURE — 96372 THER/PROPH/DIAG INJ SC/IM: CPT

## 2023-06-12 PROCEDURE — 99283 EMERGENCY DEPT VISIT LOW MDM: CPT | Mod: 25

## 2023-06-12 RX ORDER — DIPHENHYDRAMINE HCL 50 MG
50 CAPSULE ORAL ONCE
Refills: 0 | Status: COMPLETED | OUTPATIENT
Start: 2023-06-12 | End: 2023-06-12

## 2023-06-12 RX ORDER — DIPHENHYDRAMINE HCL 50 MG
50 CAPSULE ORAL ONCE
Refills: 0 | Status: DISCONTINUED | OUTPATIENT
Start: 2023-06-12 | End: 2023-06-12

## 2023-06-12 RX ORDER — FAMOTIDINE 10 MG/ML
20 INJECTION INTRAVENOUS ONCE
Refills: 0 | Status: COMPLETED | OUTPATIENT
Start: 2023-06-12 | End: 2023-06-12

## 2023-06-12 RX ADMIN — Medication 50 MILLIGRAM(S): at 14:19

## 2023-06-12 RX ADMIN — FAMOTIDINE 20 MILLIGRAM(S): 10 INJECTION INTRAVENOUS at 14:18

## 2023-06-12 NOTE — ED ADULT NURSE NOTE - NSFALLUNIVINTERV_ED_ALL_ED
Bed/Stretcher in lowest position, wheels locked, appropriate side rails in place/Call bell, personal items and telephone in reach/Instruct patient to call for assistance before getting out of bed/chair/stretcher/Non-slip footwear applied when patient is off stretcher/[place] to call system/Physically safe environment - no spills, clutter or unnecessary equipment/Purposeful proactive rounding/Room/bathroom lighting operational, light cord in reach

## 2023-06-12 NOTE — ED ADULT TRIAGE NOTE - CHIEF COMPLAINT QUOTE
pt here for allergic reaction from yesterday, believes it might have been a bee. today complaining of a rash.

## 2023-06-12 NOTE — ED PROVIDER NOTE - PATIENT PORTAL LINK FT
You can access the FollowMyHealth Patient Portal offered by [facility] by registering at the following website: [email or web address]. By joining [employer]’s FollowMyHealth portal, you will also be able to view your health information using other applications (apps) compatible with our system.

## 2023-06-12 NOTE — ED ADULT NURSE NOTE - OBJECTIVE STATEMENT
pt believes he has allergic reaction 1 day ago, was seen at hospital in [place] received a shot in thigh (unsure of medication) and prescribed prednisone. Took 60mg of prednisone this morning however pt is experiencing worsening hives to upper extremities, back and buttocks. Airway patent.

## 2023-06-12 NOTE — ED PROVIDER NOTE - ATTENDING CONTRIBUTION TO CARE
I personally evaluated the patient. I reviewed the Resident´s or Physician Assistant´s note (as assigned above), and agree with the findings and plan except as documented in my note.    67-year-old male presents to the emergency department for hives to the chest and arms.  1 day, noted them yesterday, went to the emergency department local to his home in [place], was given a steroid injection and prescription for ongoing steroids, states that symptoms are persistent, better than they were yesterday which had involvement of lip tingling but no lip swelling.  He was not given epinephrine yesterday as noted by the patient and his wife who is bedside to assist HPI.    He admits to taking several medications including lisinopril for blood pressure    Does not remember getting bit by anything but thinks a hymenoptera venom may be part of the source as this is common for him.  Other social situation as he recently moved from [place] to [place], lives in an area with new grasses and potential allergy triggers nearby.  Went for nuclear stress test today, was able to complete it without consequence, and came to the ED at [facility] after completing a stress test for persistence of his hives.    The review of systems is otherwise unremarkable.  Other allergy triggers questioned, family and patient state no new soaps or other issues.  Has never seen an allergist although he has had intermittent episodes of getting hives for several years      GENERAL: male in no distress.   HEENT: EOMI non icteric no lip swelling  NECK: FROM  CHEST: normal work of breathing noted. CTA bilateral.   CV: pulses intact S1S2 regular  ABD: soft, non rigid, non distended  EXTR: FROM   NEURO: AAO 3 no focal deficits  SKIN: Wheal-and-flare noted to medial forearms, trunk and back, no apparent distribution.  No vesicles.  No skin breakdown.  No dermatomal distribution.  PSYCH: normal mood & mentation      Impression: Allergic reaction    Plan: Continue outpatient medicines, supportive care, return and follow-up instructions.

## 2023-06-12 NOTE — ED PROVIDER NOTE - CLINICAL SUMMARY MEDICAL DECISION MAKING FREE TEXT BOX
67-year-old male presents to the emergency department for allergic reaction.  Had onset of rash to arms and chest yesterday, was seen in the emergency department local to him in [place], given injection and oral steroids to follow as an outpatient.  Has minimal resolution in symptoms, went for nuclear stress test today which she was able to complete, and presented to the emergency department again for persistence of symptoms.  States he has a known history of hymenoptera allergy but is unsure if he got bitten by anything.  Medication history includes ACE inhibitor lisinopril, has never been told not to take it, but had lip symptoms yesterday.  Will treat supportively, he has been compliant with his oral steroids, and the symptoms are greater than 24 hours, no indication for epinephrine in the emergency department, will discharge with outpatient management, he has an allergist appointment in the next 2 weeks, and return and follow-up instructions.

## 2023-06-12 NOTE — ED PROVIDER NOTE - OBJECTIVE STATEMENT
66 y/o M with PMH HTN presenting for generalized rash and itchiness. Pt states yesterday he developed swelling of his lips and tongue as well as itchy rash, was seen at ED in [place] and received "a steroid shot" and discharged. Felt fine for most of the day today but rash returned a few hours ago. Denies swelling, trouble breathing, vomiting. Pt on lisinopril from HTN.

## 2023-06-12 NOTE — ED PROVIDER NOTE - NSFOLLOWUPINSTRUCTIONS_ED_ALL_ED_FT
Stop taking lisinopril. Follow up with Dr. Heath.    Rash    A rash is a change in the color of the skin. A rash can also change the way your skin feels. There are many different conditions and factors that can cause a rash, most of which are not dangerous. Make sure to follow up with your primary care physician or a dermatologist as instructed by your health care provider.    SEEK IMMEDIATE MEDICAL CARE IF YOU HAVE ANY OF THE FOLLOWING SYMPTOMS: fever, blisters, a rash inside your mouth, vaginal or anal pain, or altered mental status.

## 2023-06-12 NOTE — ED PROVIDER NOTE - PHYSICAL EXAMINATION
CONSTITUTIONAL: Well-developed; well-nourished; in no acute distress.   SKIN: Warm, dry. Erythematous maculopapular rash to chest, abdomen, back, minimal on RUE  HEAD: Normocephalic; atraumatic  EYES: PERRL, EOMI, normal sclera and conjunctiva   ENT: No nasal discharge; airway clear. MMM. No edema of lip, tongue, posterior pharynx.   NECK: Supple; non tender.  CARD:  Regular rate and rhythm. Normal S1, S2  RESP: No increased WOB. CTA b/l without wheezes, crackles, rhonchi  ABD: Normoactive BS. Soft, nontender, nondistended.  EXT: Normal ROM.   NEURO: Alert, oriented, grossly unremarkable  PSYCH: Cooperative, appropriate.

## 2023-06-19 NOTE — STROKE CODE NOTE - PROVIDER ASSESSMENT_DATE AND TIME
Pt called asking for instruction on his procedure on 06/20. Loop Recorder. Please advise.   Thank you 22-Jul-2021 15:39

## 2024-03-12 ENCOUNTER — OUTPATIENT (OUTPATIENT)
Dept: OUTPATIENT SERVICES | Facility: HOSPITAL | Age: 69
LOS: 1 days | Discharge: HOME | End: 2024-03-12
Payer: MEDICARE

## 2024-03-12 DIAGNOSIS — Z98.890 OTHER SPECIFIED POSTPROCEDURAL STATES: Chronic | ICD-10-CM

## 2024-03-12 DIAGNOSIS — I48.19 OTHER PERSISTENT ATRIAL FIBRILLATION: ICD-10-CM

## 2024-03-12 PROCEDURE — C1764: CPT

## 2024-03-12 PROCEDURE — 33285 INSJ SUBQ CAR RHYTHM MNTR: CPT

## 2024-03-12 RX ORDER — CEPHALEXIN 500 MG
250 CAPSULE ORAL ONCE
Refills: 0 | Status: DISCONTINUED | OUTPATIENT
Start: 2024-03-12 | End: 2024-03-12

## 2024-03-12 RX ORDER — LIDOCAINE HCL 20 MG/ML
10 VIAL (ML) INJECTION ONCE
Refills: 0 | Status: DISCONTINUED | OUTPATIENT
Start: 2024-03-12 | End: 2024-03-12

## 2024-03-12 NOTE — H&P ADULT - NSHPLABSRESULTS_GEN_ALL_CORE
EC21 NSR (HR 68 bpm)  21 NSR (HR 72 bpm), PACs  21 AFib   Remote/Ambulatory Rhythm Monitorin/4-21 1 episode of AF on 8/15/21 lasting 1 hr 10 min with HR as fast as 104 bpm.   Stress Test: Recent nuclear stress test (neg per pt)   Echo: 21 EF normal. Mod LAE. Mild cLVH. Structurally normal heart valves without stenosis or regurgitation.    21    1. Left ventricular ejection fraction, by visual estimation, is >75%.   2. Hyperdynamic global left ventricular systolic function.   3. Mild dilatation of the ascending aorta (4.2 cm).  Normal LA size

## 2024-03-12 NOTE — H&P ADULT - ASSESSMENT
65 yo M with history of HTN and prostate cancer s/p prostatectomy, AFib with RVR (7/25/21 seen on ECG).  He had a two week monitor at the beginning of Aug with AFib and was started on Eliquis 5mg PO BID. Now presents for ILR implant for long term  Afib  monitoring
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
## 2024-03-12 NOTE — CHART NOTE - NSCHARTNOTEFT_GEN_A_CORE
Electrophysiology Brief Post-Op Note    I have personally seen and examined the patient.  I agree with the history and physical which I have reviewed and noted any changes below.  03-12-24 @ 10:48    PRE-OP DIAGNOSIS: AFib    POST-OP DIAGNOSIS:  AFib    PROCEDURE: Loop Implant    Physician:    Assistant: MARINA Gregory    ESTIMATED BLOOD LOSS:  2  mL    ANESTHESIA TYPE:  [  ]General Anesthesia  [  ] Sedation  [X  ] Local/Regional    CONDITION  [  ] Critical  [  ] Serious  [  ]Fair  [ X ]Good    SPECIMENS REMOVED (IF APPLICABLE):  none    IMPLANTS (IF APPLICABLE)  Loop Recorder ([employer]) [id number]  R wave Amplitude 0.51mV    FINDINGS  PLAN OF CARE  - F/U 3-4 weeks  - May remove bandaid in 2 days  - May shower in 48 hours Electrophysiology Brief Post-Op Note    I have personally seen and examined the patient.  I agree with the history and physical which I have reviewed and noted any changes below.  03-12-24 @ 10:48    PRE-OP DIAGNOSIS: AFib    POST-OP DIAGNOSIS:  AFib    PROCEDURE: Loop Implant    Physician: Dr. Sommers  Assistant: MARINA Dominguez    ESTIMATED BLOOD LOSS:  2  mL    ANESTHESIA TYPE:  [  ]General Anesthesia  [  ] Sedation  [X  ] Local/Regional    CONDITION  [  ] Critical  [  ] Serious  [  ]Fair  [ X ]Good    SPECIMENS REMOVED (IF APPLICABLE):  none    IMPLANTS (IF APPLICABLE)  Loop Recorder ([employer]) [id number]  R wave Amplitude 0.51mV    FINDINGS  PLAN OF CARE  - F/U 3-4 weeks  - May remove bandaid in 2 days  - May shower in 48 hours

## 2024-03-12 NOTE — H&P ADULT - NSHPREVIEWOFSYSTEMS_GEN_ALL_CORE
Cardiovascular:. per HPI.   Constitutional, Eyes, ENT, Respiratory, Gastrointestinal, Genitourinary, Musculoskeletal, Integumentary, Neurological, Psychiatric and Heme/Lymph are otherwise negative.

## 2024-03-15 DIAGNOSIS — I48.91 UNSPECIFIED ATRIAL FIBRILLATION: ICD-10-CM

## 2024-03-29 ENCOUNTER — APPOINTMENT (OUTPATIENT)
Dept: ELECTROPHYSIOLOGY | Facility: CLINIC | Age: 69
End: 2024-03-29

## 2024-03-29 VITALS
TEMPERATURE: 97.8 F | HEART RATE: 70 BPM | SYSTOLIC BLOOD PRESSURE: 154 MMHG | WEIGHT: 172 LBS | DIASTOLIC BLOOD PRESSURE: 80 MMHG | BODY MASS INDEX: 26.07 KG/M2 | HEIGHT: 68 IN | RESPIRATION RATE: 17 BRPM

## 2024-03-29 DIAGNOSIS — Z45.09 ENCOUNTER FOR ADJUSTMENT AND MANAGEMENT OF OTHER CARDIAC DEVICE: ICD-10-CM

## 2024-03-29 DIAGNOSIS — I48.0 PAROXYSMAL ATRIAL FIBRILLATION: ICD-10-CM

## 2024-03-29 DIAGNOSIS — Z48.89 ENCOUNTER FOR OTHER SPECIFIED SURGICAL AFTERCARE: ICD-10-CM

## 2024-03-29 PROCEDURE — 99214 OFFICE O/P EST MOD 30 MIN: CPT

## 2024-03-29 PROCEDURE — 93291 INTERROG DEV EVAL SCRMS IP: CPT

## 2024-03-29 RX ORDER — METOLAZONE 5 MG/1
5 TABLET ORAL
Qty: 14 | Refills: 0 | Status: COMPLETED | COMMUNITY
Start: 2021-08-26 | End: 2024-03-29

## 2024-03-29 RX ORDER — APIXABAN 5 MG/1
5 TABLET, FILM COATED ORAL
Qty: 60 | Refills: 0 | Status: ACTIVE | COMMUNITY
Start: 2021-08-17

## 2024-03-29 RX ORDER — LISINOPRIL 10 MG/1
10 TABLET ORAL DAILY
Refills: 0 | Status: ACTIVE | COMMUNITY

## 2024-03-29 RX ORDER — FUROSEMIDE 80 MG/1
TABLET ORAL
Refills: 0 | Status: ACTIVE | COMMUNITY

## 2024-03-29 RX ORDER — TADALAFIL 2.5 MG/1
TABLET, FILM COATED ORAL
Refills: 0 | Status: ACTIVE | COMMUNITY

## 2024-03-29 RX ORDER — METOPROLOL SUCCINATE 25 MG/1
25 TABLET, EXTENDED RELEASE ORAL
Qty: 45 | Refills: 3 | Status: ACTIVE | COMMUNITY
Start: 2024-03-29 | End: 1900-01-01

## 2024-03-29 NOTE — ASSESSMENT
[FreeTextEntry1] : # Paroxysmal AFib .  s/p Loop implant for AF management. Presents today for wound check and initial device check .  #There were 7 PAF episodes  with RVR. AF burden is >4%  - Cont Eliquis for CHADS VASc of at least 2 (HTN, Age > 65). Patient denies signs/symptoms of bleeding. - Recommend start small dose of BB for HR control and BP. Concern of S/E agreed to take  Metoprolol succ    12.5mg daily then titrate up. - Pulm referral for sleep apnea eval was provided prior by Dr. Rushing, he hasn't done it yet.  # HTN - BP is elevated today repeat 140/76 - Cont Lisinopril.  - 2g Na diet enforced  # Incision is healed - position of loop below the Right side of the L breast area.  #Remote monitoring was discussed with patient, schedule, process, as well as associated co-pay that may not covered by their insurance.  # I reviewed the recommendation of DR. Rushing for an RF ablation. would likely consider it soon we get more documentation of af burden. He understands that afib is a progressive disease that can affect heart function.  I have also advised the patient to go to the nearest emergency room if he experiences any chest pain, dyspnea, syncope, or has any other compelling symptoms.   Follow up in 12 months and prn
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
## 2024-03-29 NOTE — CARDIOLOGY SUMMARY
[de-identified] : 11/16/21 NSR (HR 68 bpm)\par  9/7/21 NSR (HR 72 bpm), PACs\par  7/25/21 AFib [de-identified] : 8/4-8/18/21 1 episode of AF on 8/15/21 lasting 1 hr 10 min with HR as fast as 104 bpm.  [de-identified] : Recent nuclear stress test (neg per pt) [de-identified] : 8/23/21 EF normal. Mod LAE. Mild cLVH. Structurally normal heart valves without stenosis or regurgitation.\par  \par  7/26/21 \par   1. Left ventricular ejection fraction, by visual estimation, is >75%.\par   2. Hyperdynamic global left ventricular systolic function.\par   3. Mild dilatation of the ascending aorta (4.2 cm).\par  Normal LA size [de-identified] : 3/12/2014- MDT loop

## 2024-03-29 NOTE — PROCEDURE
[Atrial Fibrillation] : atrial fibrillation [Normal] : The battery status is normal. [Threshold Testing Performed] : Threshold testing was performed [Sensing Amplitude ___mv] : sensing amplitude was [unfilled] mv [None] : none [Programmed for Longevity] : output reprogrammed for improved battery longevity [Counters Reset] : the counters were reset [de-identified] : Medtronic [de-identified] : [id number] [de-identified] : LINQ II [de-identified] : 03/12/2024 [de-identified] : good  [de-identified] : 7 AF episodes longest lasting 4.5 hrs with avg. v-rate = 88-113bpm.  AF burden: 4.1% Transmitting on [facility].

## 2024-03-29 NOTE — REASON FOR VISIT
[Follow-up Device Check] : is here today for a follow-up device check visit for [Other: ____] : [unfilled] [Spouse] : spouse [FreeTextEntry3] : Dr. Heath

## 2024-03-29 NOTE — PHYSICAL EXAM
[Well Developed] : well developed [Well Nourished] : well nourished [No Acute Distress] : no acute distress [Normal Conjunctiva] : normal conjunctiva [Normal Venous Pressure] : normal venous pressure [Normal S1, S2] : normal S1, S2 [No Murmur] : no murmur [Good Air Entry] : good air entry [Clear Lung Fields] : clear lung fields [Soft] : abdomen soft [No Respiratory Distress] : no respiratory distress  [Normal Gait] : normal gait [No Edema] : no edema [No Rash] : no rash [No Cyanosis] : no cyanosis [Moves all extremities] : moves all extremities [No Skin Lesions] : no skin lesions [Normal Speech] : normal speech [Alert and Oriented] : alert and oriented [Normal memory] : normal memory [General Appearance - Well Developed] : well developed [Normal Appearance] : normal appearance [Well Groomed] : well groomed [General Appearance - Well Nourished] : well nourished [General Appearance - In No Acute Distress] : no acute distress [No Deformities] : no deformities [Heart Rate And Rhythm] : heart rate and rhythm were normal [Heart Sounds] : normal S1 and S2 [Edema] : no peripheral edema present [] : no respiratory distress [Respiration, Rhythm And Depth] : normal respiratory rhythm and effort [Clean] : clean [Dry] : dry [Well-Healed] : well-healed [Bleeding] : no active bleeding [Erythema] : not erythematous [Tender] : not tender [FreeTextEntry1] : lef para sternal

## 2024-05-03 ENCOUNTER — NON-APPOINTMENT (OUTPATIENT)
Age: 69
End: 2024-05-03

## 2024-05-03 ENCOUNTER — APPOINTMENT (OUTPATIENT)
Dept: CARDIOLOGY | Facility: CLINIC | Age: 69
End: 2024-05-03
Payer: MEDICARE

## 2024-05-03 PROCEDURE — 93298 REM INTERROG DEV EVAL SCRMS: CPT

## 2024-06-07 ENCOUNTER — NON-APPOINTMENT (OUTPATIENT)
Age: 69
End: 2024-06-07

## 2024-06-07 ENCOUNTER — APPOINTMENT (OUTPATIENT)
Dept: CARDIOLOGY | Facility: CLINIC | Age: 69
End: 2024-06-07
Payer: MEDICARE

## 2024-06-07 PROCEDURE — 93298 REM INTERROG DEV EVAL SCRMS: CPT

## 2024-07-11 ENCOUNTER — APPOINTMENT (OUTPATIENT)
Dept: CARDIOLOGY | Facility: CLINIC | Age: 69
End: 2024-07-11
Payer: MEDICARE

## 2024-07-11 ENCOUNTER — NON-APPOINTMENT (OUTPATIENT)
Age: 69
End: 2024-07-11

## 2024-07-11 PROCEDURE — 93298 REM INTERROG DEV EVAL SCRMS: CPT

## 2024-08-15 ENCOUNTER — APPOINTMENT (OUTPATIENT)
Dept: CARDIOLOGY | Facility: CLINIC | Age: 69
End: 2024-08-15

## 2024-08-15 ENCOUNTER — NON-APPOINTMENT (OUTPATIENT)
Age: 69
End: 2024-08-15

## 2024-08-15 PROCEDURE — 93298 REM INTERROG DEV EVAL SCRMS: CPT

## 2024-09-19 ENCOUNTER — NON-APPOINTMENT (OUTPATIENT)
Age: 69
End: 2024-09-19

## 2024-09-19 ENCOUNTER — APPOINTMENT (OUTPATIENT)
Dept: CARDIOLOGY | Facility: CLINIC | Age: 69
End: 2024-09-19
Payer: MEDICARE

## 2024-09-19 PROCEDURE — 93298 REM INTERROG DEV EVAL SCRMS: CPT

## 2024-10-23 ENCOUNTER — APPOINTMENT (OUTPATIENT)
Dept: CARDIOLOGY | Facility: CLINIC | Age: 69
End: 2024-10-23

## 2024-10-23 PROCEDURE — 93298 REM INTERROG DEV EVAL SCRMS: CPT

## 2024-11-27 ENCOUNTER — APPOINTMENT (OUTPATIENT)
Dept: CARDIOLOGY | Facility: CLINIC | Age: 69
End: 2024-11-27

## 2024-11-27 ENCOUNTER — NON-APPOINTMENT (OUTPATIENT)
Age: 69
End: 2024-11-27

## 2024-11-27 PROCEDURE — 93298 REM INTERROG DEV EVAL SCRMS: CPT

## 2024-12-31 ENCOUNTER — NON-APPOINTMENT (OUTPATIENT)
Age: 69
End: 2024-12-31

## 2024-12-31 ENCOUNTER — APPOINTMENT (OUTPATIENT)
Dept: CARDIOLOGY | Facility: CLINIC | Age: 69
End: 2024-12-31
Payer: MEDICARE

## 2024-12-31 PROCEDURE — 93298 REM INTERROG DEV EVAL SCRMS: CPT

## 2025-02-03 ENCOUNTER — APPOINTMENT (OUTPATIENT)
Dept: CARDIOLOGY | Facility: CLINIC | Age: 70
End: 2025-02-03
Payer: MEDICARE

## 2025-02-03 ENCOUNTER — NON-APPOINTMENT (OUTPATIENT)
Age: 70
End: 2025-02-03

## 2025-02-03 PROCEDURE — 93298 REM INTERROG DEV EVAL SCRMS: CPT

## 2025-03-27 ENCOUNTER — APPOINTMENT (OUTPATIENT)
Dept: ELECTROPHYSIOLOGY | Facility: CLINIC | Age: 70
End: 2025-03-27
Payer: MEDICARE

## 2025-03-27 ENCOUNTER — NON-APPOINTMENT (OUTPATIENT)
Age: 70
End: 2025-03-27

## 2025-03-27 VITALS
BODY MASS INDEX: 27.74 KG/M2 | WEIGHT: 183 LBS | SYSTOLIC BLOOD PRESSURE: 100 MMHG | HEART RATE: 69 BPM | HEIGHT: 68 IN | DIASTOLIC BLOOD PRESSURE: 76 MMHG

## 2025-03-27 DIAGNOSIS — I48.0 PAROXYSMAL ATRIAL FIBRILLATION: ICD-10-CM

## 2025-03-27 DIAGNOSIS — Z79.01 LONG TERM (CURRENT) USE OF ANTICOAGULANTS: ICD-10-CM

## 2025-03-27 DIAGNOSIS — Z45.09 ENCOUNTER FOR ADJUSTMENT AND MANAGEMENT OF OTHER CARDIAC DEVICE: ICD-10-CM

## 2025-03-27 PROCEDURE — 93000 ELECTROCARDIOGRAM COMPLETE: CPT | Mod: 59

## 2025-03-27 PROCEDURE — 93285 PRGRMG DEV EVAL SCRMS IP: CPT

## 2025-03-27 PROCEDURE — 99214 OFFICE O/P EST MOD 30 MIN: CPT

## 2025-03-28 ENCOUNTER — APPOINTMENT (OUTPATIENT)
Dept: CARDIOLOGY | Facility: CLINIC | Age: 70
End: 2025-03-28
Payer: MEDICARE

## 2025-03-28 DIAGNOSIS — R29.818 OTHER SYMPTOMS AND SIGNS INVOLVING THE NERVOUS SYSTEM: ICD-10-CM

## 2025-03-28 PROCEDURE — 99212 OFFICE O/P EST SF 10 MIN: CPT | Mod: 93

## 2025-04-03 PROBLEM — R29.818 SUSPECTED SLEEP APNEA: Status: ACTIVE | Noted: 2025-04-03

## 2025-04-18 ENCOUNTER — APPOINTMENT (OUTPATIENT)
Dept: SLEEP CENTER | Facility: HOSPITAL | Age: 70
End: 2025-04-18

## 2025-04-18 ENCOUNTER — OUTPATIENT (OUTPATIENT)
Dept: OUTPATIENT SERVICES | Facility: HOSPITAL | Age: 70
LOS: 1 days | Discharge: HOME | End: 2025-04-18
Payer: MEDICARE

## 2025-04-18 DIAGNOSIS — Z98.890 OTHER SPECIFIED POSTPROCEDURAL STATES: Chronic | ICD-10-CM

## 2025-04-18 PROCEDURE — 95806 SLEEP STUDY UNATT&RESP EFFT: CPT

## 2025-04-18 PROCEDURE — 95800 SLP STDY UNATTENDED: CPT | Mod: 26

## 2025-04-24 DIAGNOSIS — G47.33 OBSTRUCTIVE SLEEP APNEA (ADULT) (PEDIATRIC): ICD-10-CM

## 2025-04-25 DIAGNOSIS — G47.33 OBSTRUCTIVE SLEEP APNEA (ADULT) (PEDIATRIC): ICD-10-CM

## 2025-04-29 ENCOUNTER — APPOINTMENT (OUTPATIENT)
Dept: CARDIOLOGY | Facility: CLINIC | Age: 70
End: 2025-04-29

## 2025-04-29 PROCEDURE — 93298 REM INTERROG DEV EVAL SCRMS: CPT
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
## 2025-05-16 ENCOUNTER — APPOINTMENT (OUTPATIENT)
Dept: PULMONOLOGY | Facility: CLINIC | Age: 70
End: 2025-05-16
Payer: MEDICARE

## 2025-05-16 DIAGNOSIS — G47.33 OBSTRUCTIVE SLEEP APNEA (ADULT) (PEDIATRIC): ICD-10-CM

## 2025-05-16 DIAGNOSIS — R29.818 OTHER SYMPTOMS AND SIGNS INVOLVING THE NERVOUS SYSTEM: ICD-10-CM

## 2025-05-16 DIAGNOSIS — I48.0 PAROXYSMAL ATRIAL FIBRILLATION: ICD-10-CM

## 2025-05-16 PROCEDURE — 99213 OFFICE O/P EST LOW 20 MIN: CPT

## 2025-05-16 PROCEDURE — G2211 COMPLEX E/M VISIT ADD ON: CPT

## 2025-06-03 ENCOUNTER — APPOINTMENT (OUTPATIENT)
Dept: CARDIOLOGY | Facility: CLINIC | Age: 70
End: 2025-06-03
Payer: MEDICARE

## 2025-06-03 ENCOUNTER — NON-APPOINTMENT (OUTPATIENT)
Age: 70
End: 2025-06-03

## 2025-06-03 PROCEDURE — 93298 REM INTERROG DEV EVAL SCRMS: CPT

## 2025-07-07 ENCOUNTER — APPOINTMENT (OUTPATIENT)
Dept: CARDIOLOGY | Facility: CLINIC | Age: 70
End: 2025-07-07
Payer: MEDICARE

## 2025-07-07 ENCOUNTER — NON-APPOINTMENT (OUTPATIENT)
Age: 70
End: 2025-07-07

## 2025-07-07 PROCEDURE — 93298 REM INTERROG DEV EVAL SCRMS: CPT

## 2025-08-08 ENCOUNTER — NON-APPOINTMENT (OUTPATIENT)
Age: 70
End: 2025-08-08

## 2025-08-08 ENCOUNTER — APPOINTMENT (OUTPATIENT)
Dept: CARDIOLOGY | Facility: CLINIC | Age: 70
End: 2025-08-08

## 2025-08-08 PROCEDURE — 93298 REM INTERROG DEV EVAL SCRMS: CPT
